# Patient Record
Sex: FEMALE | Race: WHITE | NOT HISPANIC OR LATINO | Employment: FULL TIME | URBAN - METROPOLITAN AREA
[De-identification: names, ages, dates, MRNs, and addresses within clinical notes are randomized per-mention and may not be internally consistent; named-entity substitution may affect disease eponyms.]

---

## 2023-09-06 ENCOUNTER — TELEPHONE (OUTPATIENT)
Dept: OBGYN CLINIC | Facility: CLINIC | Age: 33
End: 2023-09-06

## 2023-09-06 NOTE — TELEPHONE ENCOUNTER
9/6/23-1stPN transfer from [facility] (1 live birth) and  OB/GYN (1 miscarriage), will have records faxed prior to 1st appt

## 2023-09-12 NOTE — TELEPHONE ENCOUNTER
[facility] [place] [facility] Care[facility] which patient will need to sign a release in order for nurses to obtain the records. Also, please have patient sign [facility] medical release form as well so the nurse can obtain [facility] reports.

## 2023-09-29 NOTE — TELEPHONE ENCOUNTER
Did patient sign medical release for [facility] so therefore the nurse can obtain some report for 2022? Missing recent pap results.

## 2023-10-05 PROBLEM — E66.811 CLASS 1 OBESITY DUE TO EXCESS CALORIES WITHOUT SERIOUS COMORBIDITY WITH BODY MASS INDEX (BMI) OF 30.0 TO 30.9 IN ADULT: Status: ACTIVE | Noted: 2023-10-05

## 2023-10-05 LAB
EXTERNAL CHLAMYDIA SCREEN: NEGATIVE
EXTERNAL GONORRHEA SCREEN: NEGATIVE

## 2023-10-09 LAB
EXTERNAL ANTIBODY SCREEN: NORMAL
EXTERNAL HEMATOCRIT: 37.7 %
EXTERNAL HEMOGLOBIN: 12.2 G/DL
EXTERNAL HEPATITIS B SURFACE ANTIGEN: NEGATIVE
EXTERNAL HIV-1 AB: NORMAL
EXTERNAL HIV-1 P24 ANTIGEN: NORMAL
EXTERNAL HIV-1/2 AB-AG: NORMAL
EXTERNAL HIV-2 AB: NORMAL
EXTERNAL PLATELET COUNT: 228 K/ÂΜL
EXTERNAL RH FACTOR: POSITIVE
EXTERNAL RUBELLA IGG QUANTITATION: NORMAL
EXTERNAL SYPHILIS TOTAL IGG/IGM SCREENING: NONREACTIVE

## 2023-10-12 PROBLEM — R82.71 GBS BACTERIURIA: Status: ACTIVE | Noted: 2023-10-12

## 2023-10-17 PROBLEM — O99.341 ANXIETY IN PREGNANCY IN FIRST TRIMESTER, ANTEPARTUM: Status: ACTIVE | Noted: 2023-10-05

## 2023-10-17 PROBLEM — O99.210 OBESITY IN PREGNANCY, ANTEPARTUM: Status: ACTIVE | Noted: 2023-10-17

## 2023-10-18 ENCOUNTER — ROUTINE PRENATAL (OUTPATIENT)
Dept: PERINATAL CARE | Facility: OTHER | Age: 33
End: 2023-10-18
Payer: COMMERCIAL

## 2023-10-18 ENCOUNTER — TELEPHONE (OUTPATIENT)
Dept: PERINATAL CARE | Facility: OTHER | Age: 33
End: 2023-10-18

## 2023-10-18 VITALS
HEIGHT: 65 IN | SYSTOLIC BLOOD PRESSURE: 138 MMHG | DIASTOLIC BLOOD PRESSURE: 86 MMHG | BODY MASS INDEX: 30.75 KG/M2 | HEART RATE: 103 BPM | WEIGHT: 184.6 LBS

## 2023-10-18 DIAGNOSIS — Z36.89 ENCOUNTER FOR OTHER SPECIFIED ANTENATAL SCREENING: ICD-10-CM

## 2023-10-18 DIAGNOSIS — O99.341 ANXIETY IN PREGNANCY IN FIRST TRIMESTER, ANTEPARTUM: ICD-10-CM

## 2023-10-18 DIAGNOSIS — F41.9 ANXIETY IN PREGNANCY IN FIRST TRIMESTER, ANTEPARTUM: ICD-10-CM

## 2023-10-18 DIAGNOSIS — E66.09 CLASS 1 OBESITY DUE TO EXCESS CALORIES WITHOUT SERIOUS COMORBIDITY WITH BODY MASS INDEX (BMI) OF 30.0 TO 30.9 IN ADULT: ICD-10-CM

## 2023-10-18 DIAGNOSIS — O09.521 AMA (ADVANCED MATERNAL AGE) MULTIGRAVIDA 35+, FIRST TRIMESTER: Primary | ICD-10-CM

## 2023-10-18 DIAGNOSIS — Z3A.12 12 WEEKS GESTATION OF PREGNANCY: ICD-10-CM

## 2023-10-18 DIAGNOSIS — O99.210 OBESITY IN PREGNANCY, ANTEPARTUM: ICD-10-CM

## 2023-10-18 DIAGNOSIS — Z36.82 ENCOUNTER FOR (NT) NUCHAL TRANSLUCENCY SCAN: ICD-10-CM

## 2023-10-18 DIAGNOSIS — Z87.59 HISTORY OF GESTATIONAL HYPERTENSION: ICD-10-CM

## 2023-10-18 PROCEDURE — 76801 OB US < 14 WKS SINGLE FETUS: CPT | Performed by: OBSTETRICS & GYNECOLOGY

## 2023-10-18 PROCEDURE — 76813 OB US NUCHAL MEAS 1 GEST: CPT | Performed by: OBSTETRICS & GYNECOLOGY

## 2023-10-18 RX ORDER — ASPIRIN 81 MG/1
162 TABLET, CHEWABLE ORAL DAILY
Qty: 180 TABLET | Refills: 3 | Status: SHIPPED | OUTPATIENT
Start: 2023-10-18

## 2023-10-18 NOTE — TELEPHONE ENCOUNTER
Niki 10/18 @ 230 pm to schedule f/u appt from today's visit. Needs level 2 ultrasound in 6-7 weeks. Instructed patient to call the office back to have that scheduled.

## 2023-10-18 NOTE — PROGRESS NOTES
Patient chose to have [facility] Non-invasive Prenatal Screen with fetal sex (per pt request). Patient choose billed through insurance. Patient assistance program (PAP) application provided to patient no. PAP sent with specimen No.     Patient given brochure and is aware [facility] will contact their insurance and coordinate coverage. Patient made aware she will receive a text message and e-mail from [employer]. Patient informed text/phone call message will come from area code  "415". Provided [employer] # [phone] and web site at [email or web address].   "[place] your test online" card with barcode and test tube ID provided to patient. Reviewed [facility]'s web site states 5-7 business days for results via their portal.   [employer] message will be sent to patient when [facility] receives results /provider reviews. 2 vials of blood drawn from  left  arm by Albaro Boucher MA. Patient tolerated blood draw without difficulty. Specimens labeled with patient identifiers (name, date of birth, specimen collection date), order and specimen were verified with patient, packed and sent via [facility]. FED EX  tracking #  [id number]  Copy of lab order scanned to Epic media. Maternal Fetal Medicine will have results in approximately 7-10 business days and will call patient or notify via [address] [place] [address]. Patient aware viewing lab result online will reveal fetal sex if ordered. Patient verbalized understanding of all instructions and no questions at this time.

## 2023-10-18 NOTE — PATIENT INSTRUCTIONS
You elected to have non-invasive prenatal screening (NIPS). This involves a blood test to check for the four most common genetic syndromes (Trisomy 21, Trisomy 13, Trisomy 25, and sex chromosome abnormalities). It also MAY report the biologic sex of the fetus if you opted to learn this information. You can call our office to verbally review results to avoid inadvertently learning this information via [employer], if desired. Results will be visible in your [employer] portal 7-10 business days from when the test is drawn. Please follow all instructions regarding insurance cost/coverage provided to you today. Please contact our office with any concerns or questions. You will need spina bifida screening (called MSAFP) for the baby beginning at 15 weeks gestation, which will be ordered by your obstetrician's office. This test allows for earlier detection of spina bifida than is possible by ultrasound, and is advised in all pregnancies.

## 2023-10-18 NOTE — PROGRESS NOTES
[address] [facility]: Krys Durbin was seen today for nuchal translucency ultrasound. See ultrasound report under "OB Procedures" tab.    Please don't hesitate to contact our office with any concerns or questions.  -Arline Baker MD

## 2023-11-03 ENCOUNTER — TELEPHONE (OUTPATIENT)
Dept: PERINATAL CARE | Facility: CLINIC | Age: 33
End: 2023-11-03

## 2023-11-03 NOTE — TELEPHONE ENCOUNTER
Left VMM for patient with results of her [facility] NIPS test, gender not provided. Patient instructed on MSAFP being ordered by OB and timing of test. Patient receptive to information and declines further questions at this time.

## 2023-11-03 NOTE — TELEPHONE ENCOUNTER
----- Message from Reed Polanco MD sent at 11/3/2023 12:53 AM EDT -----  Ms. Geovanna Goodman   Your Cell free DNA screening returned as normal.  If you are interested in knowing what the baby's sex is, than you will need to open the lab result to see it. Your obstetrician at your next OB visit should offer screening for spina bifida that can be completed between 12 and 18 weeks and utilizes the blood test called MSAFP. This lab is to see if your baby is at increased risk to have spinal defect.     Reed Polanco MD

## 2023-11-07 ENCOUNTER — ROUTINE PRENATAL (OUTPATIENT)
Dept: OBGYN CLINIC | Facility: CLINIC | Age: 33
End: 2023-11-07
Payer: COMMERCIAL

## 2023-11-07 VITALS
WEIGHT: 183.2 LBS | SYSTOLIC BLOOD PRESSURE: 130 MMHG | BODY MASS INDEX: 30.52 KG/M2 | DIASTOLIC BLOOD PRESSURE: 80 MMHG | HEIGHT: 65 IN

## 2023-11-07 DIAGNOSIS — F41.9 ANXIETY IN PREGNANCY IN FIRST TRIMESTER, ANTEPARTUM: ICD-10-CM

## 2023-11-07 DIAGNOSIS — Z87.59 HISTORY OF GESTATIONAL HYPERTENSION: ICD-10-CM

## 2023-11-07 DIAGNOSIS — R82.71 GBS BACTERIURIA: ICD-10-CM

## 2023-11-07 DIAGNOSIS — Z3A.15 15 WEEKS GESTATION OF PREGNANCY: ICD-10-CM

## 2023-11-07 DIAGNOSIS — O09.521 AMA (ADVANCED MATERNAL AGE) MULTIGRAVIDA 35+, FIRST TRIMESTER: Primary | ICD-10-CM

## 2023-11-07 DIAGNOSIS — E66.09 CLASS 1 OBESITY DUE TO EXCESS CALORIES WITHOUT SERIOUS COMORBIDITY WITH BODY MASS INDEX (BMI) OF 30.0 TO 30.9 IN ADULT: ICD-10-CM

## 2023-11-07 DIAGNOSIS — B00.9 HSV-2 INFECTION: ICD-10-CM

## 2023-11-07 DIAGNOSIS — Z36.1 NEED FOR MATERNAL SERUM ALPHA-PROTEIN (MSAFP) SCREENING: ICD-10-CM

## 2023-11-07 DIAGNOSIS — O99.341 ANXIETY IN PREGNANCY IN FIRST TRIMESTER, ANTEPARTUM: ICD-10-CM

## 2023-11-07 LAB
SL AMB  POCT GLUCOSE, UA: NORMAL
SL AMB POCT URINE PROTEIN: NORMAL

## 2023-11-07 PROCEDURE — PNV: Performed by: PHYSICIAN ASSISTANT

## 2023-11-07 PROCEDURE — 81002 URINALYSIS NONAUTO W/O SCOPE: CPT | Performed by: PHYSICIAN ASSISTANT

## 2023-11-07 NOTE — ASSESSMENT & PLAN NOTE
White coat HTN. Initial BP: 146/72. Repeat was 130/80. Patient took BP at home today. This am 119/71  Afternoon 109/69  Immediately 113/73    Continue  mg until 36 weeks.

## 2023-11-07 NOTE — ASSESSMENT & PLAN NOTE
Reviewed initial prenatal labs, results console updated. Reviewed AFP to evaluate for ONTD. Script given. Has Level II ultrasound scheduled. Return to office for ob check in 4 weeks.

## 2023-11-07 NOTE — PROGRESS NOTES
Routine Prenatal Visit  [address]  [address] [place] [address], Anahy Nealufmann, [address]    Assessment/Plan:  Gab Beaver is a 26y.o. year old  at 15w4d who presents for routine prenatal visit. 1. AMA (advanced maternal age) multigravida 33+, first trimester    2. 15 weeks gestation of pregnancy  Assessment & Plan:  Reviewed initial prenatal labs, results console updated. Reviewed AFP to evaluate for ONTD. Script given. Has Level II ultrasound scheduled. Return to office for ob check in 4 weeks. Orders:  -     POCT urine dip    3. Class 1 obesity due to excess calories without serious comorbidity with body mass index (BMI) of 30.0 to 30.9 in adult    4. History of gestational hypertension  Assessment & Plan:  White coat HTN. Initial BP: 146/72. Repeat was 130/80. Patient took BP at home today. This am 119/71  Afternoon 109/69  Immediately 113/73    Continue  mg until 36 weeks. 5. Anxiety in pregnancy in first trimester, antepartum  Assessment & Plan:  Managed with Zoloft 100mg daily      6. HSV-2 infection  Assessment & Plan: Will plan valtrex at 36 weeks. 7. GBS bacteriuria  Assessment & Plan: Will plan to treat at delivery      8. Need for maternal serum alpha-protein (MSAFP) screening  -     Alpha fetoprotein, maternal; Future  -     Alpha fetoprotein, maternal        Next OB Visit  weeks. Subjective:     CC: Prenatal care    Manisha Herr is a 26 y.o.  female who presents for routine prenatal care at 15w4d. Pregnancy ROS: Reports nausea, no vomiting. No FM, HA, cramping, VB, LOF, edema, domestic violence, or smoking. Tolerating PNV.     This am 119/71  Afternoon 109/69  Immediately 113/73    The following portions of the patient's history were reviewed and updated as appropriate: allergies, current medications, past family history, past medical history, obstetric history, gynecologic history, past social history, past surgical history and problem list.      Objective:  /80 (BP Location: Left arm, Patient Position: Sitting, Cuff Size: Standard) Comment: white coat syndrome  Ht 5' 5" (1.651 m)   Wt 83.1 kg (183 lb 3.2 oz)   LMP 2023 (Exact Date)   BMI 30.49 kg/m²   Pregravid Weight/BMI: 85.3 kg (188 lb) (BMI 31.28)  Current Weight: 83.1 kg (183 lb 3.2 oz)   Total Weight Gain: -2.177 kg (-4 lb 12.8 oz)   Pre- Vitals      Flowsheet Row Most Recent Value   Prenatal Assessment    Fetal Heart Rate 160   Fundal Height (cm) 15 cm   Movement Absent   Prenatal Vitals    Blood Pressure 130/80  [white coat syndrome]   Weight - Scale 83.1 kg (183 lb 3.2 oz)   Urine Albumin/Glucose    Dilation/Effacement/Station    Vaginal Drainage    Edema    LLE Edema None   RLE Edema None   Facial Edema None             General: Well appearing, no distress  Abdomen: Soft, gravid, nontender  Fundal Height: Appropriate for gestational age. Extremities: Warm and well perfused. Non tender.

## 2023-12-04 ENCOUNTER — ROUTINE PRENATAL (OUTPATIENT)
Dept: OBGYN CLINIC | Facility: CLINIC | Age: 33
End: 2023-12-04
Payer: COMMERCIAL

## 2023-12-04 VITALS — WEIGHT: 183.4 LBS | DIASTOLIC BLOOD PRESSURE: 70 MMHG | BODY MASS INDEX: 30.52 KG/M2 | SYSTOLIC BLOOD PRESSURE: 130 MMHG

## 2023-12-04 DIAGNOSIS — O98.312 GENITAL HERPES AFFECTING PREGNANCY IN SECOND TRIMESTER: ICD-10-CM

## 2023-12-04 DIAGNOSIS — O99.212 OBESITY AFFECTING PREGNANCY IN SECOND TRIMESTER, UNSPECIFIED OBESITY TYPE: Primary | ICD-10-CM

## 2023-12-04 DIAGNOSIS — F41.9 ANXIETY IN PREGNANCY IN FIRST TRIMESTER, ANTEPARTUM: ICD-10-CM

## 2023-12-04 DIAGNOSIS — R82.71 GBS BACTERIURIA: ICD-10-CM

## 2023-12-04 DIAGNOSIS — O99.341 ANXIETY IN PREGNANCY IN FIRST TRIMESTER, ANTEPARTUM: ICD-10-CM

## 2023-12-04 DIAGNOSIS — A60.09 GENITAL HERPES AFFECTING PREGNANCY IN SECOND TRIMESTER: ICD-10-CM

## 2023-12-04 DIAGNOSIS — Z3A.19 19 WEEKS GESTATION OF PREGNANCY: ICD-10-CM

## 2023-12-04 DIAGNOSIS — Z87.59 HISTORY OF GESTATIONAL HYPERTENSION: ICD-10-CM

## 2023-12-04 DIAGNOSIS — O09.521 AMA (ADVANCED MATERNAL AGE) MULTIGRAVIDA 35+, FIRST TRIMESTER: ICD-10-CM

## 2023-12-04 PROBLEM — O99.210 OBESITY AFFECTING PREGNANCY: Status: ACTIVE | Noted: 2023-10-05

## 2023-12-04 PROBLEM — O98.319 GENITAL HERPES AFFECTING PREGNANCY: Status: ACTIVE | Noted: 2023-10-05

## 2023-12-04 LAB
SL AMB  POCT GLUCOSE, UA: NEGATIVE
SL AMB POCT URINE PROTEIN: NEGATIVE

## 2023-12-04 PROCEDURE — PNV: Performed by: STUDENT IN AN ORGANIZED HEALTH CARE EDUCATION/TRAINING PROGRAM

## 2023-12-04 PROCEDURE — 81002 URINALYSIS NONAUTO W/O SCOPE: CPT | Performed by: STUDENT IN AN ORGANIZED HEALTH CARE EDUCATION/TRAINING PROGRAM

## 2023-12-04 NOTE — PROGRESS NOTES
Routine Prenatal Visit  [address]  [facility], [address]    Assessment/Plan:  Li Munoz is a 26y.o. year old  at 19w3d who presents for routine prenatal visit. 1. Obesity affecting pregnancy in second trimester, unspecified obesity type    2. AMA (advanced maternal age) multigravida 33+, first trimester  Assessment & Plan:  - Recommend  mg PO daily for pre-eclampsia risk reduction due to BMI, AMA, and history of gHTN. 3. Anxiety in pregnancy in first trimester, antepartum  Assessment & Plan:  - Mood stable on Zoloft 100 mg PO daily. 4. 19 weeks gestation of pregnancy  Assessment & Plan:  - Prenatal lab results reviewed. - Aneuploidy screening reviewed. Patient plans to have msAFP drawn this week. - First trimester MFM consult report reviewed. Level II ultrasound is scheduled. - Problem list updated, results console reviewed and updated with pertinent prenatal labs. - PMH, PSH, medications reviewed and updated as needed. - Return to office in 4 wk(s) for routine prenatal care. Orders:  -     POCT urine dip    5. Genital herpes affecting pregnancy in second trimester  Assessment & Plan:  - Plan for Valtrex suppression at 36 weeks. 6. History of gestational hypertension  Assessment & Plan:  - Recommend  mg PO daily for pre-eclampsia risk reduction due to BMI, AMA, and history of gHTN. 7. GBS bacteriuria  Assessment & Plan:  - Plan for prophylaxis intrapartum. Subjective:   Yashira Warner is a 26 y.o.  who presents for routine prenatal care at 19w3d. Complaints today: No  LOF: No; VB: No; Contractions: No; FM: Present    Objective:  /70   Wt 83.2 kg (183 lb 6.4 oz)   LMP 2023 (Exact Date)   BMI 30.52 kg/m²     General: Well appearing, no distress  Respiratory: Unlabored breathing  Cardiovascular: Regular rate. Abdomen: Soft, gravid, nontender  Extremities: Warm and well perfused.   Non tender.     Pregravid Weight/BMI: 85.3 kg (188 lb) (BMI 31.28)  Current Weight: 83.2 kg (183 lb 6.4 oz)   Total Weight Gain: -2.087 kg (-4 lb 9.6 oz)     Pre- Vitals      Flowsheet Row Most Recent Value   Prenatal Assessment    Movement Present   Prenatal Vitals    Blood Pressure 130/70   Weight - Scale 83.2 kg (183 lb 6.4 oz)   Urine Albumin/Glucose    Dilation/Effacement/Station    Vaginal Drainage    Edema              Priscilla Valles MD  2023 2:10 PM

## 2023-12-06 ENCOUNTER — ROUTINE PRENATAL (OUTPATIENT)
Dept: PERINATAL CARE | Facility: OTHER | Age: 33
End: 2023-12-06
Payer: COMMERCIAL

## 2023-12-06 VITALS
BODY MASS INDEX: 30.12 KG/M2 | HEIGHT: 65 IN | DIASTOLIC BLOOD PRESSURE: 70 MMHG | SYSTOLIC BLOOD PRESSURE: 122 MMHG | HEART RATE: 107 BPM | WEIGHT: 180.8 LBS

## 2023-12-06 DIAGNOSIS — Z3A.19 19 WEEKS GESTATION OF PREGNANCY: ICD-10-CM

## 2023-12-06 DIAGNOSIS — Z36.86 ENCOUNTER FOR ANTENATAL SCREENING FOR CERVICAL LENGTH: ICD-10-CM

## 2023-12-06 DIAGNOSIS — O09.522 AMA (ADVANCED MATERNAL AGE) MULTIGRAVIDA 35+, SECOND TRIMESTER: Primary | ICD-10-CM

## 2023-12-06 PROCEDURE — 76817 TRANSVAGINAL US OBSTETRIC: CPT | Performed by: OBSTETRICS & GYNECOLOGY

## 2023-12-06 PROCEDURE — 76811 OB US DETAILED SNGL FETUS: CPT | Performed by: OBSTETRICS & GYNECOLOGY

## 2023-12-06 PROCEDURE — 99213 OFFICE O/P EST LOW 20 MIN: CPT | Performed by: OBSTETRICS & GYNECOLOGY

## 2023-12-06 NOTE — PROGRESS NOTES
The patient was seen today for an ultrasound. Please see ultrasound report (located under Ob Procedures) for additional details. Thank you very much for allowing us to participate in the care of this very nice patient. Should you have any questions, please do not hesitate to contact me. [place] [id number] [facility]  Attending Physician, [address] [place] [address]

## 2023-12-06 NOTE — PROGRESS NOTES
Ultrasound Probe Disinfection    A transvaginal ultrasound was performed. Prior to use, disinfection was performed with High Level Disinfection Process ([employer]). Probe serial number U2: [id number] was used.       Sarah Rosenberg  12/06/23  7:57 AM

## 2023-12-11 LAB
# FETUSES US: 1
AFP ADJ MOM SERPL: 0.85
AFP INTERP SERPL-IMP: NORMAL
AFP SERPL-MCNC: 44.2 NG/ML
AGE: NORMAL
DONATED EGG PATIENT QL: NO
GA CLIN EST: 20 WEEKS
GA METHOD: NORMAL
HX OF NTD NARR: NO
HX OF TRISOMY 21 NARR: NO
IDDM PATIENT QL: NO
NEURAL TUBE DEFECT RISK FETUS: NORMAL %
SL AMB REPEAT SPECIMEN: NO

## 2023-12-28 ENCOUNTER — TELEPHONE (OUTPATIENT)
Dept: OBGYN CLINIC | Facility: CLINIC | Age: 33
End: 2023-12-28

## 2023-12-28 NOTE — TELEPHONE ENCOUNTER
Patient called into office reporting testing positive for covid.  Patient states she has congestive symptoms and feeling achy.  She denies any LOF, vaginal bleeding, or pain.  She confirms fetal movement.  Pt advised to push fluids and increase water intake, and informed she can take sudafed but w/o the DM. Pt advised to call office back if she is still symptomatic before next routine prenatal appointment.  Pt voiced understanding and appreciation for phone call.

## 2023-12-30 ENCOUNTER — OFFICE VISIT (OUTPATIENT)
Dept: URGENT CARE | Facility: CLINIC | Age: 33
End: 2023-12-30
Payer: COMMERCIAL

## 2023-12-30 VITALS
RESPIRATION RATE: 18 BRPM | WEIGHT: 181 LBS | TEMPERATURE: 97.6 F | BODY MASS INDEX: 30.16 KG/M2 | SYSTOLIC BLOOD PRESSURE: 122 MMHG | DIASTOLIC BLOOD PRESSURE: 70 MMHG | OXYGEN SATURATION: 99 % | HEIGHT: 65 IN | HEART RATE: 90 BPM

## 2023-12-30 DIAGNOSIS — J02.9 SORE THROAT: ICD-10-CM

## 2023-12-30 DIAGNOSIS — U07.1 COVID-19: Primary | ICD-10-CM

## 2023-12-30 LAB — S PYO AG THROAT QL: NEGATIVE

## 2023-12-30 PROCEDURE — 99213 OFFICE O/P EST LOW 20 MIN: CPT

## 2023-12-30 PROCEDURE — 87880 STREP A ASSAY W/OPTIC: CPT

## 2023-12-30 NOTE — PROGRESS NOTES
[facility]        NAME: Michelle Doe is a 33 y.o. female  : 1990    MRN: [id number]  DATE: 2023  TIME: 1:50 PM    Assessment and Plan   COVID-19 [U07.1]  1. COVID-19        2. Sore throat  POCT rapid strepA    Throat culture            Patient Instructions     Your rapid strep test was negative. No antibiotics are needed at this time.     Throat swab will be sent for definitive culture. You can download [facility] MyChart for the results which take approximately 48-72 hours. You will be notified if positive.     For sore throat you can use Cepacol lozenges, do warm salt water gargles, drink warm water with lemon or herbal teas, or use an over-the-counter throat spray (Chloraseptic).    Follow up with your PCP in 3-5 days if symptoms persist.    Go to the ER if symptoms significantly worsen.       Chief Complaint     Chief Complaint   Patient presents with    Cough     Pt c/o sore throat and productive cough. She states symptoms began Tuesday. She tested positive for COVID-19 using a home test on Tuesday. She is 23 weeks pregnant.          History of Present Illness       This is a 33-year-old female known Covid positive  currently 23-weeks-gestation presenting with sore throat and a cough x5 days. No CP, chest tightness, SOB, or wheezing. Also with chills, body aches, and a few episodes of vomiting. No known fevers. Took Benadryl last night in order to sleep.         Review of Systems   Review of Systems   Constitutional:  Positive for chills. Negative for fever.   HENT:  Positive for sore throat. Negative for congestion, ear pain and rhinorrhea.    Eyes:  Negative for discharge and redness.   Respiratory:  Positive for cough. Negative for chest tightness, shortness of breath and wheezing.    Cardiovascular:  Negative for chest pain and palpitations.   Gastrointestinal:  Positive for vomiting. Negative for abdominal pain and diarrhea.   Musculoskeletal:  Positive for  "myalgias. Negative for joint swelling.   Skin:  Negative for pallor and rash.   Neurological:  Negative for dizziness, light-headedness and headaches.         Current Medications       Current Outpatient Medications:     aspirin 81 mg chewable tablet, Chew 2 tablets (162 mg total) daily, Disp: 180 tablet, Rfl: 3    Prenatal-FE Bis-FA-DHA w/o A (COMPLETE PRENATAL/DHA PO), Take by mouth Natiure's Bounty with DHA (43 mg) 8 mg EPA, Disp: , Rfl:     sertraline (ZOLOFT) 100 mg tablet, Take 100 mg by mouth daily, Disp: , Rfl:     Current Allergies     Allergies as of 12/30/2023    (No Known Allergies)            The following portions of the patient's history were reviewed and updated as appropriate: allergies, current medications, past family history, past medical history, past social history, past surgical history and problem list.     Past Medical History:   Diagnosis Date    Anxiety     History of abnormal cervical Pap smear     +HPV    History of ovarian cyst     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 2010       Past Surgical History:   Procedure Laterality Date    TONSILECTOMY AND ADNOIDECTOMY      WISDOM TOOTH EXTRACTION         Family History   Problem Relation Age of Onset    Esophageal cancer Father          Medications have been verified.        Objective   /70 (BP Location: Right arm, Patient Position: Sitting, Cuff Size: Standard)   Pulse 90   Temp 97.6 °F (36.4 °C)   Resp 18   Ht 5' 5\" (1.651 m)   Wt 82.1 kg (181 lb)   LMP 07/21/2023 (Exact Date)   SpO2 99%   BMI 30.12 kg/m²        Physical Exam     Physical Exam  Vitals and nursing note reviewed.   Constitutional:       General: She is not in acute distress.     Appearance: She is not ill-appearing or diaphoretic.   HENT:      Head: Normocephalic.      Right Ear: Tympanic membrane, ear canal and external ear normal.      Left Ear: Tympanic membrane, ear canal and external ear normal.      Nose: Nose normal.      " Mouth/Throat:      Mouth: Mucous membranes are moist.      Pharynx: Oropharynx is clear. Posterior oropharyngeal erythema present. No oropharyngeal exudate.   Eyes:      Conjunctiva/sclera: Conjunctivae normal.      Pupils: Pupils are equal, round, and reactive to light.   Cardiovascular:      Rate and Rhythm: Normal rate and regular rhythm.      Heart sounds: Normal heart sounds.   Pulmonary:      Effort: Pulmonary effort is normal.      Breath sounds: Normal breath sounds.   Musculoskeletal:         General: Normal range of motion.      Cervical back: Normal range of motion and neck supple.   Lymphadenopathy:      Cervical: No cervical adenopathy.   Skin:     General: Skin is warm and dry.      Capillary Refill: Capillary refill takes less than 2 seconds.   Neurological:      Mental Status: She is alert and oriented to person, place, and time.

## 2023-12-30 NOTE — PATIENT INSTRUCTIONS
Your rapid strep test was negative. No antibiotics are needed at this time.     Throat swab will be sent for definitive culture. You can download [employer] for the results which take approximately 48-72 hours. You will be notified if positive.     For sore throat you can use Cepacol lozenges, do warm salt water gargles, drink warm water with lemon or herbal teas, or use an over-the-counter throat spray (Chloraseptic).    Follow up with your PCP in 3-5 days if symptoms persist.    Go to the ER if symptoms significantly worsen.

## 2023-12-31 ENCOUNTER — AMB VIDEO VISIT (OUTPATIENT)
Dept: OTHER | Facility: HOSPITAL | Age: 33
End: 2023-12-31

## 2023-12-31 PROCEDURE — ECARE PR SL URGENT CARE VIRTUAL VISIT: Performed by: INTERNAL MEDICINE

## 2023-12-31 NOTE — CARE ANYWHERE EVISITS
Visit Summary for Michelle Doe - Gender: Female - Date of Birth: 1990  Date: [id number] - Duration: 4 minutes  Patient: Michelle Doe  Provider: Chayo Juarez    Patient Contact Information  Address  [address] [place]; [place] [address]  [phone]    Visit Topics  Sinus infection  [Added By: Self - 2023-12-31]    Triage Questions   What is your current physical address in the event of a medical emergency? Answer []  Are you allergic to any medications? Answer []  Are you now or could you be pregnant? Answer []  Do you have any immune system compromise or chronic lung   disease? Answer []  Do you have any vulnerable family members in the home (infant, pregnant, cancer, elderly)? Answer []     Conversation Transcripts  [0A][0A] [Notification] Ricardo Quezada, Global Staff, will help you prepare for your visit. She is assisting Chayo Juarez, Adult Medicine.[0A][Ricardo Quezada] Caren, and thank you for connecting. While you are waiting for the doctor, are there any   questions I can answer for you about our service? Please contact customer service if you have questions about billing, insurance, or technical issues. Visits work best with a stable WiFi connection, so please make sure you are connected before we   begin.[0A][Notification] Ricardo Quezada has left the room.[0A][Notification] You are connected with Chayo Juarez, Adult Medicine.[0A][Notification] Michelle Doe is located in [place].[0A][Notification] Michelle Doe has shared health   history...[0A]    Diagnosis  Acute sinusitis, unspecified  COVID-19    Procedures  Value: [id number] Code: CPT-4 UNLISTED E&M SERVICE    Medications Prescribed    amoxicillin  Dose : 1 tablet  Route : oral  Frequency : 2 times a day. Until directed to stop.     Refills : 0  Instructions to the Pharmacist : check with OBGYN prior to taking. Substitutions allowed      Provider Notes  [0A][0A] Mode of Communication: audio[0A]History of Present  Illness[0A]        Michelle  presented with  covid and is pregnant .. She has sinus pressure,  congestion, pain, a sore throat and a cough.  She has been sick for  about  5   days.  and she is   feeling worse [0A]Past Medical History: well[0A]Medications: reviewed[0A]Allergies: nkda[0A]PHYSICAL EXAM: The following is taken from my personal audible examination of Pt CONSTITUTIONAL: Pt is not in any acute distress - is speaking in full   sentences.[0A]ORAL/OROPHARYNGEAL: . No audible hoarseness.[0A]RESPIRATORY: Negative for audible wheezing, cough and conversational dyspnea or stridor. [0A]PSYCH: Appropriate affect. Speech is coherent in casual conversation.[0A]The remainder of the   physical exam is noncontributory[0A]Assessment  covid /sinusitis ,  probably still viral[0A]Plan  rest, increase fluids[0A]        .    Home care:[0A]meds .. if no better in 2-3 days and your OBGYN says it okay then take amoxicillin 500 mg 1 pill twice a   day for 7 days[0A]        a.    Rest, fluids, saline drops and menthol[0A]        b.    Pain relievers: Unless otherwise noted and as long as there's no reason you should not take these consider Acetaminophen, [0A]        c.    For the congestion: ,   nasal saline rinses, humidifier, [0A].    please call back or your pcp if needed[0A]Additional recommendations[0A]1.    If you received a prescription at this visit and you have a question or problem, please call [phone] for prescription   assistance.[0A]2.    Please print a copy of this note and send it to your regular doctor or take it to your next visit so it may be included in your medical record.[0A]3.    Please see your primary care provider on an annual basis or more frequently if   directed.[0A][0A]    Electronically signed by: Chayo Juarez(NPI [id number])

## 2024-01-02 ENCOUNTER — ROUTINE PRENATAL (OUTPATIENT)
Dept: OBGYN CLINIC | Facility: CLINIC | Age: 34
End: 2024-01-02
Payer: COMMERCIAL

## 2024-01-02 VITALS — BODY MASS INDEX: 30.12 KG/M2 | WEIGHT: 181 LBS | DIASTOLIC BLOOD PRESSURE: 70 MMHG | SYSTOLIC BLOOD PRESSURE: 110 MMHG

## 2024-01-02 DIAGNOSIS — Z3A.23 23 WEEKS GESTATION OF PREGNANCY: Primary | ICD-10-CM

## 2024-01-02 DIAGNOSIS — Z23 NEED FOR INFLUENZA VACCINATION: ICD-10-CM

## 2024-01-02 DIAGNOSIS — R82.71 GBS BACTERIURIA: ICD-10-CM

## 2024-01-02 DIAGNOSIS — A60.09 GENITAL HERPES AFFECTING PREGNANCY IN SECOND TRIMESTER: ICD-10-CM

## 2024-01-02 DIAGNOSIS — O98.312 GENITAL HERPES AFFECTING PREGNANCY IN SECOND TRIMESTER: ICD-10-CM

## 2024-01-02 LAB
SL AMB  POCT GLUCOSE, UA: NEGATIVE
SL AMB POCT URINE PROTEIN: NEGATIVE

## 2024-01-02 PROCEDURE — 90686 IIV4 VACC NO PRSV 0.5 ML IM: CPT | Performed by: OBSTETRICS & GYNECOLOGY

## 2024-01-02 PROCEDURE — 90471 IMMUNIZATION ADMIN: CPT | Performed by: OBSTETRICS & GYNECOLOGY

## 2024-01-02 PROCEDURE — PNV: Performed by: OBSTETRICS & GYNECOLOGY

## 2024-01-02 PROCEDURE — 81002 URINALYSIS NONAUTO W/O SCOPE: CPT | Performed by: OBSTETRICS & GYNECOLOGY

## 2024-01-02 RX ORDER — AMOXICILLIN 500 MG/1
TABLET, FILM COATED ORAL
COMMUNITY
Start: 2023-12-31

## 2024-01-02 NOTE — PROGRESS NOTES
Routine Prenatal Visit  [facility] - [place] [address], [address], [place] [address]    Assessment/Plan:  Michelle is a 33 y.o. year old  at 23w4d who presents for routine prenatal visit.     1. 23 weeks gestation of pregnancy  -     POCT urine dip    2. Genital herpes affecting pregnancy in second trimester    3. GBS bacteriuria    4. Need for influenza vaccination  -     influenza vaccine, quadrivalent, 0.5 mL, preservative-free, for adult and pediatric patients 6 mos+ (AFLURIA, FLUARIX, FLULAVAL, FLUZONE)      + fm  no UTCX   + COVID  in  second trimester   2023   has  fu  US.   No leaking of fluid.   Flu shot today.      Subjective:     CC: Prenatal care    Michelle Doe is a 33 y.o.  female who presents for routine prenatal care at 23w4d.  Pregnancy ROS: no  leakage of fluid, pelvic pain, or vaginal bleeding.  +  fetal movement.    The following portions of the patient's history were reviewed and updated as appropriate: allergies, current medications, past family history, past medical history, obstetric history, gynecologic history, past social history, past surgical history and problem list.      Objective:  /70   Wt 82.1 kg (181 lb)   LMP 2023 (Exact Date)   BMI 30.12 kg/m²   Pregravid Weight/BMI: 85.3 kg (188 lb) (BMI 31.28)  Current Weight: 82.1 kg (181 lb)   Total Weight Gain: -3.175 kg (-7 lb)   Pre- Vitals    Flowsheet Row Most Recent Value   Prenatal Assessment    Fetal Heart Rate 136   Fundal Height (cm) 24 cm   Prenatal Vitals    Blood Pressure 110/70   Weight - Scale 82.1 kg (181 lb)   Urine Albumin/Glucose    Dilation/Effacement/Station    Vaginal Drainage    Draining Fluid No   Edema    LLE Edema None   RLE Edema None   Facial Edema None           General: Well appearing, no distress  Respiratory: Unlabored breathing  Cardiovascular: Regular rate.  Abdomen: Soft, gravid, nontender  Fundal Height: Appropriate for gestational age.  Extremities:  Warm and well perfused.  Non tender.

## 2024-01-02 NOTE — PATIENT INSTRUCTIONS
NUTRITION IN PREGNANCY  Good Nutrition is a VERY important part of having a healthy pregnancy and healthy baby.  You should follow a healthy diet which include the following:   * Vegetables (which are dark green and leafy): at least 2 servings each day   * Protein (meat, eggs, beans, nuts, peanut butter): 3-4 servings each day   * Breads/whole grains (bread, pasta, rice, tortillas, potatoes): 3 servings each day   * Dairy (milk, yogurt, cheese): 3-4 servings each day   * Water: 6-8 glasses per day   * Calories: approximately 2000 to 2200 calories per day     WEIGHT GAIN   Recommended weight gain for you during your pregnancy is based on your body mass index (BMI) at the time that you became pregnant.   Pre-pregnant BMI Recommended weight gain   Underweight (BMI less than 18.5) 28 to 40 pounds   Normal weight (BMI 18.5-24.9) 25 to 35 pounds   Overweight (BMI 25-29.9) 15 to 25 pounds   Obese (BMI 30 or greater) 11 to 20 pounds     FOOD SAFETY   It is VERY important to eat only safely-prepared foods during pregnancy as you and your baby have a higher risk than usual for being affected by foodborne illnesses.  Follow these steps to ensure that you and your baby are safe from foodborne illnesses while you are pregnant:   wash hands thoroughly with warm water and soap before and after handling any foods   wash cutting boards, dishes, utensils, and countertops with hot water and soap before and after preparing any foods   rinse raw fruits and vegetables thoroughly under running water before eating   keep raw meat and seafood separate from other foods and use different cutting boards/utensils to handle raw meat than for other foods   put cooked food on a freshly clean plate   cook all of your foods thoroughly   discard foods that have been left out for more than 2 hours   refrigerate or freeze any foods than can spoil     There are three particular foodborne risks that you should be aware of and avoid as they can cause  serious harm to your unborn child.     * Listeria (a harmful bacteria)   don’t eat hot dogs or deli meats (unless they’re reheated until steaming hot)   don’t eat soft cheeses (such as Feta, Brie, Camembert) unless they are specifically labeled as being “made with pasteurized milk”   don’t drink raw (unpasteurized) milk   don’t eat refrigerated pates or meat spreads   don’t eat refrigerated smoked seafood unless it’s in a cooked dish like a casserole     * Mercury (a metal which is found in certain fish in high levels)   don’t eat shark, tilefish, frances mackerel, or swordfish   don’t eat more than 12 ounces per week of shrimp, salmon, pollock, or catfish   when eating tuna fish, you can have up to 6 ounces per week of canned albacore tuna OR up to 12 ounces of canned light tuna     * Toxoplasma (a harmful parasite)   cook meat thoroughly before eating   wear gloves when gardening or handling sand from a child’s sandbox   if you ha tve a cat, have someone else change the litter box while you are pregnant.    if you HAVE to clean it yourself, be sure to wash your hands thoroughly afterwards with warm water and soap.   don’t get a NEW cat while you are pregnant

## 2024-01-24 ENCOUNTER — TELEPHONE (OUTPATIENT)
Dept: OBGYN CLINIC | Facility: CLINIC | Age: 34
End: 2024-01-24

## 2024-01-24 NOTE — TELEPHONE ENCOUNTER
"Pt is currently 26w5d GA. Pt reports she slipped down a snowy hill last evening, fell on her bottom.  States, \"it was not a hard fall and did not strike her abdomen.\" Pt denies any pelvic pain, spotting, contractions, or leakage of fluids. Pt subjectively reports good fetal activity, has a \"home doppler\" and hearing a good fetal heart tone.    Conferred with Dr. Perera, advised to monitor, if she should have the onset of spotting, leakage of fluids, pelvic pain, call the office or on call provider. Reinforced to call next time after incident occurred to discuss with OB. Pt voiced an understanding.   "

## 2024-02-01 ENCOUNTER — ROUTINE PRENATAL (OUTPATIENT)
Dept: OBGYN CLINIC | Facility: CLINIC | Age: 34
End: 2024-02-01
Payer: COMMERCIAL

## 2024-02-01 VITALS
WEIGHT: 183 LBS | BODY MASS INDEX: 30.49 KG/M2 | HEIGHT: 65 IN | DIASTOLIC BLOOD PRESSURE: 74 MMHG | SYSTOLIC BLOOD PRESSURE: 118 MMHG

## 2024-02-01 DIAGNOSIS — Z3A.27 27 WEEKS GESTATION OF PREGNANCY: Primary | ICD-10-CM

## 2024-02-01 DIAGNOSIS — Z87.59 HISTORY OF GESTATIONAL HYPERTENSION: ICD-10-CM

## 2024-02-01 DIAGNOSIS — O98.313 GENITAL HERPES AFFECTING PREGNANCY IN THIRD TRIMESTER: ICD-10-CM

## 2024-02-01 DIAGNOSIS — O09.523 ADVANCED MATERNAL AGE IN MULTIGRAVIDA, THIRD TRIMESTER: ICD-10-CM

## 2024-02-01 DIAGNOSIS — R82.71 GBS BACTERIURIA: ICD-10-CM

## 2024-02-01 DIAGNOSIS — A60.09 GENITAL HERPES AFFECTING PREGNANCY IN THIRD TRIMESTER: ICD-10-CM

## 2024-02-01 LAB
SL AMB  POCT GLUCOSE, UA: NORMAL
SL AMB POCT URINE PROTEIN: NORMAL

## 2024-02-01 PROCEDURE — PNV: Performed by: NURSE PRACTITIONER

## 2024-02-01 PROCEDURE — 81002 URINALYSIS NONAUTO W/O SCOPE: CPT | Performed by: NURSE PRACTITIONER

## 2024-02-01 NOTE — PROGRESS NOTES
"Routine Prenatal Visit  [facility] - [place]  [address], [place] [address]    Assessment/Plan:  Michelle is a 33 y.o. year old  at 27w6d who presents for routine prenatal visit.     1. 27 weeks gestation of pregnancy  -     Glucose, 1H PG; Future  -     CBC; Future  -     RPR (MONITOR) W/REFL TITER (REFL); Future  -     POCT urine dip  -     Glucose, 1H PG  -     CBC  -     RPR (MONITOR) W/REFL TITER (REFL)    2. History of gestational hypertension    3. GBS bacteriuria    4. Genital herpes affecting pregnancy in third trimester    5. Advanced maternal age in multigravida, third trimester    Rx third trimester labs, RH positive. Follow up growth scheduled for . Plan Valtrex suppression at 36 weeks. Reviewed [facility], S/S PTL, call office with any concerns.         Next OB Visit 2 weeks.    Subjective:     CC: Prenatal care    Michelle Marion is a 33 y.o.  female who presents for routine prenatal care at 27w6d.  Pregnancy ROS: no leakage of fluid, pelvic pain, or vaginal bleeding.  normal fetal movement.    The following portions of the patient's history were reviewed and updated as appropriate: allergies, current medications, past family history, past medical history, obstetric history, gynecologic history, past social history, past surgical history and problem list.      Objective:  /74 (BP Location: Left arm, Patient Position: Sitting, Cuff Size: Standard)   Ht 5' 5\" (1.651 m)   Wt 83 kg (183 lb)   LMP 2023 (Exact Date)   BMI 30.45 kg/m²   Pregravid Weight/BMI: 85.3 kg (188 lb) (BMI 31.28)  Current Weight: 83 kg (183 lb)   Total Weight Gain: -2.268 kg (-5 lb)   Pre-Dave Vitals      Flowsheet Row Most Recent Value   Prenatal Assessment    Fetal Heart Rate 148   Fundal Height (cm) 27 cm   Movement Present   Prenatal Vitals    Blood Pressure 118/74   Weight - Scale 83 kg (183 lb)   Urine Albumin/Glucose    Dilation/Effacement/Station    Vaginal Drainage    Draining Fluid " No   Edema    LLE Edema None   RLE Edema None   Facial Edema None             General: Well appearing, no distress  Abdomen: Soft, gravid, nontender  Extremities: Non tender.

## 2024-02-01 NOTE — PATIENT INSTRUCTIONS
Rx third trimester labs, RH positive. Follow up growth scheduled for 2/28. Plan Valtrex suppression at 36 weeks. Reviewed [facility], S/S PTL, call office with any concerns.

## 2024-02-07 LAB
EXTERNAL HEMOGLOBIN: 10.5 G/DL
EXTERNAL PLATELET COUNT: 210 K/ÂΜL
EXTERNAL SYPHILIS TOTAL IGG/IGM SCREENING: NORMAL

## 2024-02-08 LAB
ERYTHROCYTE [DISTWIDTH] IN BLOOD BY AUTOMATED COUNT: 12.9 % (ref 11–15)
GLUCOSE 1H P 50 G GLC PO SERPL-MCNC: 89 MG/DL
HCT VFR BLD AUTO: 32.7 % (ref 35–45)
HGB BLD-MCNC: 10.5 G/DL (ref 11.7–15.5)
MCH RBC QN AUTO: 27.9 PG (ref 27–33)
MCHC RBC AUTO-ENTMCNC: 32.1 G/DL (ref 32–36)
MCV RBC AUTO: 87 FL (ref 80–100)
PLATELET # BLD AUTO: 210 THOUSAND/UL (ref 140–400)
PMV BLD REES-ECKER: 11.1 FL (ref 7.5–12.5)
RBC # BLD AUTO: 3.76 MILLION/UL (ref 3.8–5.1)
RPR SER QL: NORMAL
WBC # BLD AUTO: 10.7 THOUSAND/UL (ref 3.8–10.8)

## 2024-02-13 PROBLEM — O99.213 OBESITY AFFECTING PREGNANCY IN THIRD TRIMESTER: Status: ACTIVE | Noted: 2023-10-05

## 2024-02-13 PROBLEM — O99.343 ANXIETY DURING PREGNANCY IN THIRD TRIMESTER, ANTEPARTUM: Status: ACTIVE | Noted: 2023-10-05

## 2024-02-13 PROBLEM — Z3A.29 29 WEEKS GESTATION OF PREGNANCY: Status: ACTIVE | Noted: 2023-11-07

## 2024-02-13 PROBLEM — O99.013 ANEMIA AFFECTING PREGNANCY IN THIRD TRIMESTER: Status: ACTIVE | Noted: 2024-02-13

## 2024-02-14 ENCOUNTER — ROUTINE PRENATAL (OUTPATIENT)
Dept: OBGYN CLINIC | Facility: CLINIC | Age: 34
End: 2024-02-14
Payer: COMMERCIAL

## 2024-02-14 VITALS
HEIGHT: 65 IN | SYSTOLIC BLOOD PRESSURE: 118 MMHG | DIASTOLIC BLOOD PRESSURE: 72 MMHG | BODY MASS INDEX: 31.12 KG/M2 | WEIGHT: 186.8 LBS

## 2024-02-14 DIAGNOSIS — O09.523 AMA (ADVANCED MATERNAL AGE) MULTIGRAVIDA 35+, THIRD TRIMESTER: ICD-10-CM

## 2024-02-14 DIAGNOSIS — F41.9 ANXIETY DURING PREGNANCY IN THIRD TRIMESTER, ANTEPARTUM: ICD-10-CM

## 2024-02-14 DIAGNOSIS — O99.013 ANEMIA AFFECTING PREGNANCY IN THIRD TRIMESTER: Primary | ICD-10-CM

## 2024-02-14 DIAGNOSIS — Z3A.29 29 WEEKS GESTATION OF PREGNANCY: ICD-10-CM

## 2024-02-14 DIAGNOSIS — O99.343 ANXIETY DURING PREGNANCY IN THIRD TRIMESTER, ANTEPARTUM: ICD-10-CM

## 2024-02-14 DIAGNOSIS — Z23 ENCOUNTER FOR IMMUNIZATION: ICD-10-CM

## 2024-02-14 DIAGNOSIS — O98.313 GENITAL HERPES AFFECTING PREGNANCY IN THIRD TRIMESTER: ICD-10-CM

## 2024-02-14 DIAGNOSIS — A60.09 GENITAL HERPES AFFECTING PREGNANCY IN THIRD TRIMESTER: ICD-10-CM

## 2024-02-14 LAB
SL AMB  POCT GLUCOSE, UA: NEGATIVE
SL AMB POCT URINE PROTEIN: NEGATIVE

## 2024-02-14 PROCEDURE — 81002 URINALYSIS NONAUTO W/O SCOPE: CPT | Performed by: OBSTETRICS & GYNECOLOGY

## 2024-02-14 PROCEDURE — 90471 IMMUNIZATION ADMIN: CPT | Performed by: OBSTETRICS & GYNECOLOGY

## 2024-02-14 PROCEDURE — 90715 TDAP VACCINE 7 YRS/> IM: CPT | Performed by: OBSTETRICS & GYNECOLOGY

## 2024-02-14 PROCEDURE — PNV: Performed by: OBSTETRICS & GYNECOLOGY

## 2024-02-14 NOTE — PROGRESS NOTES
"Routine Prenatal Visit  [facility] - [place] [address], [address], [place] [address]    Assessment/Plan:  Michelle is a 33 y.o. year old  at 29w5d who presents for routine prenatal visit.     1. Anemia affecting pregnancy in third trimester  Assessment & Plan:  Reviewed mild anemia - recom start PO iron.  Patient agrees.      2. AMA (advanced maternal age) multigravida 35+, third trimester    3. Anxiety during pregnancy in third trimester, antepartum  Assessment & Plan:  Well controlled on Zoloft.      4. Genital herpes affecting pregnancy in third trimester  Assessment & Plan:  Will plan Valtrex at 36 weeks for prophylaxis.  Patient reports no outbreaks in > 5 years.      5. Encounter for immunization  -     Tdap Vaccine greater than or equal to 8yo    6. 29 weeks gestation of pregnancy  -     POCT urine dip        Next OB Visit 2 weeks.    Subjective:     CC: Prenatal care    Michelle Marion is a 33 y.o.  female who presents for routine prenatal care at 29w5d.  Pregnancy ROS: no leakage of fluid, pelvic pain, or vaginal bleeding.  normal fetal movement.    The following portions of the patient's history were reviewed and updated as appropriate: allergies, current medications, past family history, past medical history, obstetric history, gynecologic history, past social history, past surgical history and problem list.      Objective:  /72   Ht 5' 5\" (1.651 m)   Wt 84.7 kg (186 lb 12.8 oz)   LMP 2023 (Exact Date)   BMI 31.09 kg/m²   Pregravid Weight/BMI: 85.3 kg (188 lb) (BMI 31.28)  Current Weight: 84.7 kg (186 lb 12.8 oz)   Total Weight Gain: -0.544 kg (-1 lb 3.2 oz)   Pre- Vitals      Flowsheet Row Most Recent Value   Prenatal Assessment    Fetal Heart Rate 155   Fundal Height (cm) 30 cm   Movement Present   Prenatal Vitals    Blood Pressure 118/72   Weight - Scale 84.7 kg (186 lb 12.8 oz)   Urine Albumin/Glucose    Dilation/Effacement/Station    Vaginal Drainage "    Edema    LLE Edema None   RLE Edema None   Facial Edema None             General: Well appearing, no distress  Abdomen: Soft, gravid, nontender  Extremities: Non tender.

## 2024-02-21 ENCOUNTER — NURSE TRIAGE (OUTPATIENT)
Dept: OTHER | Facility: OTHER | Age: 34
End: 2024-02-21

## 2024-02-22 ENCOUNTER — ROUTINE PRENATAL (OUTPATIENT)
Dept: OBGYN CLINIC | Facility: CLINIC | Age: 34
End: 2024-02-22

## 2024-02-22 VITALS
BODY MASS INDEX: 30.82 KG/M2 | DIASTOLIC BLOOD PRESSURE: 80 MMHG | HEIGHT: 65 IN | WEIGHT: 185 LBS | SYSTOLIC BLOOD PRESSURE: 140 MMHG

## 2024-02-22 DIAGNOSIS — O98.313 GENITAL HERPES AFFECTING PREGNANCY IN THIRD TRIMESTER: ICD-10-CM

## 2024-02-22 DIAGNOSIS — R82.71 GBS BACTERIURIA: ICD-10-CM

## 2024-02-22 DIAGNOSIS — O99.213 OBESITY AFFECTING PREGNANCY IN THIRD TRIMESTER, UNSPECIFIED OBESITY TYPE: Primary | ICD-10-CM

## 2024-02-22 DIAGNOSIS — O09.523 AMA (ADVANCED MATERNAL AGE) MULTIGRAVIDA 35+, THIRD TRIMESTER: ICD-10-CM

## 2024-02-22 DIAGNOSIS — F41.9 ANXIETY DURING PREGNANCY IN THIRD TRIMESTER, ANTEPARTUM: ICD-10-CM

## 2024-02-22 DIAGNOSIS — A60.09 GENITAL HERPES AFFECTING PREGNANCY IN THIRD TRIMESTER: ICD-10-CM

## 2024-02-22 DIAGNOSIS — O99.343 ANXIETY DURING PREGNANCY IN THIRD TRIMESTER, ANTEPARTUM: ICD-10-CM

## 2024-02-22 DIAGNOSIS — Z87.59 HISTORY OF GESTATIONAL HYPERTENSION: ICD-10-CM

## 2024-02-22 DIAGNOSIS — O99.013 ANEMIA AFFECTING PREGNANCY IN THIRD TRIMESTER: ICD-10-CM

## 2024-02-22 PROBLEM — Z3A.31 31 WEEKS GESTATION OF PREGNANCY: Status: ACTIVE | Noted: 2023-11-07

## 2024-02-22 PROCEDURE — PNV: Performed by: OBSTETRICS & GYNECOLOGY

## 2024-02-22 NOTE — TELEPHONE ENCOUNTER
"Reason for Disposition  • Single episode of faint spotting (e.g., noted when wiping after going to bathroom)    Answer Assessment - Initial Assessment Questions  1. ONSET: \"When did this bleeding start?\"         Just now     2. DESCRIPTION: \"Describe the bleeding that you are having.\" \"How much bleeding is there?\"     - SPOTTING: spotting, or pinkish / brownish mucous discharge; does not fill panti-liner or pad     - MILD:  less than 1 pad / hour; less than patient's usual menstrual bleeding    - MODERATE: 1-2 pads / hour; 1 menstrual cup every 6 hours; small-medium blood clots (e.g., pea, grape, small coin)    - SEVERE: soaking 2 or more pads/hour for 2 or more hours; 1 menstrual cup every 2 hours; bleeding not contained by pads or continuous red blood from vagina; large blood clots (e.g., golf ball, large coin)       Spotting-bright red     3. ABDOMINAL PAIN SEVERITY: If present, ask: \"How bad is it?\"  (e.g., Scale 1-10; mild, moderate, or severe)    - MILD (1-3): doesn't interfere with normal activities, abdomen soft and not tender to touch     - MODERATE (4-7): interferes with normal activities or awakens from sleep, tender to touch     - SEVERE (8-10): excruciating pain, doubled over, unable to do any normal activities      Denies    4. PREGNANCY: \"Do you know how many weeks or months pregnant you are?\"       30 weeks 5 days     5. DARIN: \"What date are you expecting to deliver?\"      4/26/24    6. FETAL MOVEMENT: \"Has the baby's movement decreased or changed significantly from normal?\"      Denies    7. HEMODYNAMIC STATUS: \"Are you weak or feeling lightheaded?\" If Yes, ask: \"Can you stand and walk normally?\"       Denies    8. OTHER SYMPTOMS: \"What other symptoms are you having with the bleeding?\" (e.g., leaking fluid from vagina, contractions)      Denies    Protocols used: Pregnancy - Vaginal Bleeding Greater Than 20 Weeks EGA-ADULT-AH    "

## 2024-02-22 NOTE — PROGRESS NOTES
"Routine Prenatal Visit  [facility] - [place]  [address] [place] [address]    Assessment/Plan:  Michelle is a 33 y.o. year old  at 30w6d who presents for routine prenatal visit.     1. Obesity affecting pregnancy in third trimester, unspecified obesity type    2. History of gestational hypertension    3. AMA (advanced maternal age) multigravida 35+, third trimester  Assessment & Plan:  Has repeat growth next week      4. Anxiety during pregnancy in third trimester, antepartum    5. Genital herpes affecting pregnancy in third trimester    6. GBS bacteriuria    7. Anemia affecting pregnancy in third trimester          Subjective:   Michelle Marion is a 33 y.o.  who presents for routine prenatal care at 30w6d.  Complaints today: Spotting when wiping. No recent trauma, intercourse. No pain. No contraction.  LOF: -; VB: +; Contractions: -; FM: +    Objective:  /80 (BP Location: Left arm, Patient Position: Sitting, Cuff Size: Standard)   Ht 5' 5\" (1.651 m)   Wt 83.9 kg (185 lb)   LMP 2023 (Exact Date)   BMI 30.79 kg/m²     General: Well appearing, no distress  Respiratory: Unlabored breathing  Abdomen: Soft, gravid, nontender  Pelvic exam: Cervix visually closed, no active bleeding, +friable cervix  Extremities: Warm and well perfused.  Non tender.    Pregravid Weight/BMI: 85.3 kg (188 lb) (BMI 31.28)  Current Weight: 83.9 kg (185 lb)   Total Weight Gain: -1.361 kg (-3 lb)     Pre-Dave Vitals      Flowsheet Row Most Recent Value   Prenatal Assessment    Fetal Heart Rate 160   Fundal Height (cm) 31 cm   Movement Present   Prenatal Vitals    Blood Pressure 140/80   Weight - Scale 83.9 kg (185 lb)   Urine Albumin/Glucose    Dilation/Effacement/Station    Vaginal Drainage    Edema              Ivy Arita DO  2024 11:19 AM    "

## 2024-02-22 NOTE — TELEPHONE ENCOUNTER
"Regardin weeks pregnant / light vaginal spotting  ----- Message from Rosi Paredes sent at 2024  8:39 PM EST -----  \"I am 31 weeks pregnant and I have a little bit of spotting. I am not sure what I should do\"    "

## 2024-02-22 NOTE — TELEPHONE ENCOUNTER
Michelle states has been having constipation after starting Iron supplements. Noticed some red color vaginal spotting upon wiping last night. Denies any cramping pains or contractions. (+)FM. The spotting has lessened this morning, is more brownish in color. Reassured about spotting may occur after bearing down with bowel movements. Recommended to take Colace,help with constipation. Michelle verbally agreed & planned on buying Colace today. Informed Dr. Del Castillo of Michelle's symptoms & advised to schedule an office appt.. Informed pt of scheduling an appt.. Provided her with 11 am with Dr. Arita, in [place] office..

## 2024-02-22 NOTE — ASSESSMENT & PLAN NOTE
Reassurance given on spotting. Discussed signs of when to call back. Has appt in 1 week, desires to keep

## 2024-02-27 NOTE — PROGRESS NOTES
Please refer to the [facility] ultrasound report in Ob Procedures for additional information regarding today's visit

## 2024-02-28 ENCOUNTER — ULTRASOUND (OUTPATIENT)
Dept: PERINATAL CARE | Facility: OTHER | Age: 34
End: 2024-02-28
Payer: COMMERCIAL

## 2024-02-28 VITALS
SYSTOLIC BLOOD PRESSURE: 128 MMHG | BODY MASS INDEX: 30.99 KG/M2 | HEART RATE: 105 BPM | WEIGHT: 186 LBS | HEIGHT: 65 IN | DIASTOLIC BLOOD PRESSURE: 70 MMHG

## 2024-02-28 DIAGNOSIS — O99.213 MATERNAL OBESITY, ANTEPARTUM, THIRD TRIMESTER: ICD-10-CM

## 2024-02-28 DIAGNOSIS — O09.523 ELDERLY MULTIGRAVIDA, THIRD TRIMESTER: Primary | ICD-10-CM

## 2024-02-28 DIAGNOSIS — Z3A.31 31 WEEKS GESTATION OF PREGNANCY: ICD-10-CM

## 2024-02-28 DIAGNOSIS — F41.9 ANXIETY DURING PREGNANCY, ANTEPARTUM, THIRD TRIMESTER: ICD-10-CM

## 2024-02-28 DIAGNOSIS — O99.343 ANXIETY DURING PREGNANCY, ANTEPARTUM, THIRD TRIMESTER: ICD-10-CM

## 2024-02-28 PROCEDURE — 99213 OFFICE O/P EST LOW 20 MIN: CPT | Performed by: OBSTETRICS & GYNECOLOGY

## 2024-02-28 PROCEDURE — 76816 OB US FOLLOW-UP PER FETUS: CPT | Performed by: OBSTETRICS & GYNECOLOGY

## 2024-02-28 RX ORDER — ASCORBIC ACID 100 MG
TABLET,CHEWABLE ORAL
COMMUNITY

## 2024-02-28 NOTE — LETTER
February 28, 2024     Ivy Arita V,   [address]  [facility] [address]    Patient: Michelle Marion   YOB: 1990   Date of Visit: 2/28/2024       Dear Dr. Arita:    Thank you for referring Michelle Marion to me for evaluation. Below are my notes for this consultation.    If you have questions, please do not hesitate to call me. I look forward to following your patient along with you.         Sincerely,        Carter Meade MD        CC: No Recipients    Carter Meade MD  2/28/2024  9:26 AM  Sign when Signing Visit  Please refer to the [facility] ultrasound report in Ob Procedures for additional information regarding today's visit

## 2024-03-12 ENCOUNTER — ROUTINE PRENATAL (OUTPATIENT)
Dept: OBGYN CLINIC | Facility: CLINIC | Age: 34
End: 2024-03-12
Payer: COMMERCIAL

## 2024-03-12 VITALS — DIASTOLIC BLOOD PRESSURE: 62 MMHG | WEIGHT: 187.2 LBS | SYSTOLIC BLOOD PRESSURE: 110 MMHG | BODY MASS INDEX: 31.15 KG/M2

## 2024-03-12 DIAGNOSIS — O09.893 PRIOR PREGNANCY COMPLICATED BY PIH, ANTEPARTUM, THIRD TRIMESTER: Primary | ICD-10-CM

## 2024-03-12 DIAGNOSIS — A60.09 GENITAL HERPES AFFECTING PREGNANCY IN THIRD TRIMESTER: ICD-10-CM

## 2024-03-12 DIAGNOSIS — O99.213 OBESITY AFFECTING PREGNANCY IN THIRD TRIMESTER, UNSPECIFIED OBESITY TYPE: ICD-10-CM

## 2024-03-12 DIAGNOSIS — O99.820 GROUP B STREPTOCOCCAL CARRIAGE COMPLICATING PREGNANCY: ICD-10-CM

## 2024-03-12 DIAGNOSIS — O99.343 ANXIETY DURING PREGNANCY IN THIRD TRIMESTER, ANTEPARTUM: ICD-10-CM

## 2024-03-12 DIAGNOSIS — O99.013 ANEMIA AFFECTING PREGNANCY IN THIRD TRIMESTER: ICD-10-CM

## 2024-03-12 DIAGNOSIS — O98.313 GENITAL HERPES AFFECTING PREGNANCY IN THIRD TRIMESTER: ICD-10-CM

## 2024-03-12 DIAGNOSIS — O09.523 AMA (ADVANCED MATERNAL AGE) MULTIGRAVIDA 35+, THIRD TRIMESTER: ICD-10-CM

## 2024-03-12 DIAGNOSIS — F41.9 ANXIETY DURING PREGNANCY IN THIRD TRIMESTER, ANTEPARTUM: ICD-10-CM

## 2024-03-12 DIAGNOSIS — Z3A.33 33 WEEKS GESTATION OF PREGNANCY: ICD-10-CM

## 2024-03-12 PROBLEM — O09.899 PRIOR PREGNANCY COMPLICATED BY PIH, ANTEPARTUM: Status: ACTIVE | Noted: 2023-10-05

## 2024-03-12 LAB
DME PARACHUTE DELIVERY DATE REQUESTED: NORMAL
DME PARACHUTE DELIVERY NOTE: NORMAL
DME PARACHUTE ITEM DESCRIPTION: NORMAL
DME PARACHUTE ORDER STATUS: NORMAL
DME PARACHUTE SUPPLIER NAME: NORMAL
DME PARACHUTE SUPPLIER PHONE: NORMAL
SL AMB  POCT GLUCOSE, UA: NEGATIVE
SL AMB POCT URINE PROTEIN: NEGATIVE

## 2024-03-12 PROCEDURE — 81002 URINALYSIS NONAUTO W/O SCOPE: CPT | Performed by: STUDENT IN AN ORGANIZED HEALTH CARE EDUCATION/TRAINING PROGRAM

## 2024-03-12 PROCEDURE — PNV: Performed by: STUDENT IN AN ORGANIZED HEALTH CARE EDUCATION/TRAINING PROGRAM

## 2024-03-12 RX ORDER — VALACYCLOVIR HYDROCHLORIDE 500 MG/1
500 TABLET, FILM COATED ORAL 2 TIMES DAILY
Qty: 30 TABLET | Refills: 1 | Status: SHIPPED | OUTPATIENT
Start: 2024-03-12 | End: 2024-05-11

## 2024-03-12 NOTE — PATIENT INSTRUCTIONS
You should take your blood pressure at home one time daily.     Normal-range blood pressures in pregnancy are less than 140 for the top number (systolic) AND less than 90 for the bottom number (diastolic)    Mild-range blood pressures in pregnancy are 140-159 systolic OR  diastolic    Severe-range blood pressures in pregnancy are 160 or greater stystolic  or greater diastolic    If your blood pressure is greater than 140/90, you should repeat your blood pressure in 15 minutes and then call [facility] [place] at [phone] and proceed to the hospital for evaluation for pre-eclampsia.    Symptoms of Pre-Eclampsia include headache that does not go away with Tylenol, changes in vision such as blurred vision or spots in your vision, chest pain, shortness of breath, pain in the upper right side of your abdomen, or sudden onset or worsening of swelling.    If you develop any of the above symptoms, you should promptly call [facility] [place] at [phone] and present to the hospital for evaluation of pre-eclampsia.

## 2024-03-12 NOTE — PROGRESS NOTES
Routine Prenatal Visit  [facility] - [place]  [address], [place] [address]    Assessment/Plan:  Michelle is a 33 y.o. year old  at 33w4d who presents for routine prenatal visit.     1. Prior pregnancy complicated by PIH, antepartum, third trimester  Assessment & Plan:  - Continue  mg PO daily until 36 weeks for pre-eclampsia risk reduction.   - Recommend daily BP monitoring at home. Reviewed parameters. Call if SBP > 140 or DBP > 90. Written instructions provided in AVS today.       2. AMA (advanced maternal age) multigravida 35+, third trimester    3. Anxiety during pregnancy in third trimester, antepartum  Assessment & Plan:  - Mood stable on Zoloft 100 mg PO daily. Will continue.       4. Genital herpes affecting pregnancy in third trimester  Assessment & Plan:  - Recommend suppression beginning at 36 weeks. Rx provided today.     Orders:  -     valACYclovir (VALTREX) 500 mg tablet; Take 1 tablet (500 mg total) by mouth 2 (two) times a day    5. Obesity affecting pregnancy in third trimester, unspecified obesity type  Assessment & Plan:  - Weight gain on track.  - Continue  mg PO daily until 36 weeks.       6. Group B streptococcal carriage complicating pregnancy  Assessment & Plan:  - Plan for intrapartum prophylaxis with penicillin.       7. Anemia affecting pregnancy in third trimester  Assessment & Plan:  - Continue oral iron supplementation.      8. 33 weeks gestation of pregnancy  Assessment & Plan:  - PTL/PPROM/Bleeding precautions given. Kick counts reviewed.  - Third trimester labs reviewed.  - Problem list updated, results console reviewed and updated with pertinent prenatal labs.  - PMH, PSH, medications reviewed and updated as needed  - Return to office in 2 wk(s) for routine prenatal care      Orders:  -     POCT urine dip          Subjective:   Michelle Marion is a 33 y.o.  who presents for routine prenatal care at 33w4d.  Complaints today: No  LOF: No;  VB: No; Contractions: No; FM: Present    Objective:  /62   Wt 84.9 kg (187 lb 3.2 oz)   LMP 2023 (Exact Date)   BMI 31.15 kg/m²     General: Well appearing, no distress  Respiratory: Unlabored breathing  Cardiovascular: Regular rate.  Abdomen: Soft, gravid, nontender  Extremities: Warm and well perfused.  Non tender.    Pregravid Weight/BMI: 85.3 kg (188 lb) (BMI 31.28)  Current Weight: 84.9 kg (187 lb 3.2 oz)   Total Weight Gain: -0.363 kg (-12.8 oz)     Pre-Dave Vitals      Flowsheet Row Most Recent Value   Prenatal Assessment    Fetal Heart Rate 145   Fundal Height (cm) 33 cm   Movement Present   Presentation Vertex   Prenatal Vitals    Blood Pressure 110/62   Weight - Scale 84.9 kg (187 lb 3.2 oz)   Urine Albumin/Glucose    Dilation/Effacement/Station    Vaginal Drainage    Draining Fluid No   Edema    LLE Edema None   RLE Edema None   Facial Edema None             Gera Perera MD  3/12/2024 9:01 AM

## 2024-03-12 NOTE — ASSESSMENT & PLAN NOTE
- Continue  mg PO daily until 36 weeks for pre-eclampsia risk reduction.   - Recommend daily BP monitoring at home. Reviewed parameters. Call if SBP > 140 or DBP > 90. Written instructions provided in AVS today.

## 2024-03-12 NOTE — ASSESSMENT & PLAN NOTE
- PTL/PPROM/Bleeding precautions given. Kick counts reviewed.  - Third trimester labs reviewed.  - Problem list updated, results console reviewed and updated with pertinent prenatal labs.  - PMH, PSH, medications reviewed and updated as needed  - Return to office in 2 wk(s) for routine prenatal care

## 2024-03-28 ENCOUNTER — ROUTINE PRENATAL (OUTPATIENT)
Dept: OBGYN CLINIC | Facility: CLINIC | Age: 34
End: 2024-03-28
Payer: COMMERCIAL

## 2024-03-28 VITALS — SYSTOLIC BLOOD PRESSURE: 110 MMHG | DIASTOLIC BLOOD PRESSURE: 66 MMHG | BODY MASS INDEX: 31.52 KG/M2 | WEIGHT: 189.4 LBS

## 2024-03-28 DIAGNOSIS — Z3A.35 35 WEEKS GESTATION OF PREGNANCY: ICD-10-CM

## 2024-03-28 DIAGNOSIS — A60.09 GENITAL HERPES AFFECTING PREGNANCY IN THIRD TRIMESTER: ICD-10-CM

## 2024-03-28 DIAGNOSIS — O09.523 AMA (ADVANCED MATERNAL AGE) MULTIGRAVIDA 35+, THIRD TRIMESTER: Primary | ICD-10-CM

## 2024-03-28 DIAGNOSIS — O99.820 GROUP B STREPTOCOCCAL CARRIAGE COMPLICATING PREGNANCY: ICD-10-CM

## 2024-03-28 DIAGNOSIS — O98.313 GENITAL HERPES AFFECTING PREGNANCY IN THIRD TRIMESTER: ICD-10-CM

## 2024-03-28 LAB
SL AMB  POCT GLUCOSE, UA: NEGATIVE
SL AMB POCT URINE PROTEIN: NEGATIVE

## 2024-03-28 PROCEDURE — PNV: Performed by: OBSTETRICS & GYNECOLOGY

## 2024-03-28 PROCEDURE — 81002 URINALYSIS NONAUTO W/O SCOPE: CPT | Performed by: OBSTETRICS & GYNECOLOGY

## 2024-03-28 NOTE — PROGRESS NOTES
Routine Prenatal Visit  [facility] - [place]  [address] [place] [address]    Assessment/Plan:  Michelle is a 34 y.o. year old  at 35w6d who presents for routine prenatal visit.     1. AMA (advanced maternal age) multigravida 35+, third trimester    2. Genital herpes affecting pregnancy in third trimester  Assessment & Plan:  Starting valtrex tomorrow      3. Group B streptococcal bacteriuria complicating pregnancy    4. 35 weeks gestation of pregnancy  -     POCT urine dip          Subjective:     CC: Prenatal care    Michelle Marion is a 34 y.o.  female who presents for routine prenatal care at 35w6d.  Pregnancy ROS: no leakage of fluid, pelvic pain, or vaginal bleeding.  normal fetal movement.    The following portions of the patient's history were reviewed and updated as appropriate: allergies, current medications, past family history, past medical history, obstetric history, gynecologic history, past social history, past surgical history and problem list.      Objective:  /66   Wt 85.9 kg (189 lb 6.4 oz)   LMP 2023 (Exact Date)   BMI 31.52 kg/m²   Pregravid Weight/BMI: 85.3 kg (188 lb) (BMI 31.28)  Current Weight: 85.9 kg (189 lb 6.4 oz)   Total Weight Gain: 0.635 kg (1 lb 6.4 oz)   Pre- Vitals      Flowsheet Row Most Recent Value   Prenatal Assessment    Fetal Heart Rate 140   Fundal Height (cm) 36 cm   Movement Present   Presentation Vertex   Prenatal Vitals    Blood Pressure 110/66   Weight - Scale 85.9 kg (189 lb 6.4 oz)   Urine Albumin/Glucose    Dilation/Effacement/Station    Vaginal Drainage    Edema              General: Well appearing, no distress  Respiratory: Unlabored breathing  Cardiovascular: Regular rate.  Abdomen: Soft, gravid, nontender  Fundal Height: Appropriate for gestational age.  Extremities: Warm and well perfused.  Non tender.

## 2024-04-01 LAB
DME PARACHUTE DELIVERY DATE ACTUAL: NORMAL
DME PARACHUTE DELIVERY DATE REQUESTED: NORMAL
DME PARACHUTE DELIVERY NOTE: NORMAL
DME PARACHUTE ITEM DESCRIPTION: NORMAL
DME PARACHUTE ORDER STATUS: NORMAL
DME PARACHUTE SUPPLIER NAME: NORMAL
DME PARACHUTE SUPPLIER PHONE: NORMAL

## 2024-04-05 ENCOUNTER — ROUTINE PRENATAL (OUTPATIENT)
Dept: OBGYN CLINIC | Facility: CLINIC | Age: 34
End: 2024-04-05
Payer: COMMERCIAL

## 2024-04-05 VITALS — SYSTOLIC BLOOD PRESSURE: 128 MMHG | BODY MASS INDEX: 31.48 KG/M2 | DIASTOLIC BLOOD PRESSURE: 70 MMHG | WEIGHT: 189.2 LBS

## 2024-04-05 DIAGNOSIS — O99.013 ANEMIA AFFECTING PREGNANCY IN THIRD TRIMESTER: ICD-10-CM

## 2024-04-05 DIAGNOSIS — A60.09 GENITAL HERPES AFFECTING PREGNANCY IN THIRD TRIMESTER: ICD-10-CM

## 2024-04-05 DIAGNOSIS — O99.343 ANXIETY DURING PREGNANCY IN THIRD TRIMESTER, ANTEPARTUM: ICD-10-CM

## 2024-04-05 DIAGNOSIS — O09.899 PRIOR PREGNANCY COMPLICATED BY PIH, ANTEPARTUM: ICD-10-CM

## 2024-04-05 DIAGNOSIS — O99.820 GROUP B STREPTOCOCCAL CARRIAGE COMPLICATING PREGNANCY: ICD-10-CM

## 2024-04-05 DIAGNOSIS — O99.213 OBESITY AFFECTING PREGNANCY IN THIRD TRIMESTER, UNSPECIFIED OBESITY TYPE: ICD-10-CM

## 2024-04-05 DIAGNOSIS — O09.523 AMA (ADVANCED MATERNAL AGE) MULTIGRAVIDA 35+, THIRD TRIMESTER: ICD-10-CM

## 2024-04-05 DIAGNOSIS — Z3A.37 37 WEEKS GESTATION OF PREGNANCY: Primary | ICD-10-CM

## 2024-04-05 DIAGNOSIS — O98.313 GENITAL HERPES AFFECTING PREGNANCY IN THIRD TRIMESTER: ICD-10-CM

## 2024-04-05 DIAGNOSIS — Z36.85 ANTENATAL SCREENING FOR STREPTOCOCCUS B: ICD-10-CM

## 2024-04-05 DIAGNOSIS — F41.9 ANXIETY DURING PREGNANCY IN THIRD TRIMESTER, ANTEPARTUM: ICD-10-CM

## 2024-04-05 LAB
SL AMB  POCT GLUCOSE, UA: NEGATIVE
SL AMB POCT URINE PROTEIN: NEGATIVE

## 2024-04-05 PROCEDURE — 81002 URINALYSIS NONAUTO W/O SCOPE: CPT | Performed by: OBSTETRICS & GYNECOLOGY

## 2024-04-05 PROCEDURE — PNV: Performed by: OBSTETRICS & GYNECOLOGY

## 2024-04-05 NOTE — PROGRESS NOTES
Routine Prenatal Visit  [facility] [place] [address], [place] [address]    Assessment/Plan:  Michelle is a 34 y.o. year old  at 37w0d who presents for routine prenatal visit.     1. 37 weeks gestation of pregnancy  -     POCT urine dip    2.  screening for streptococcus B    3. Obesity affecting pregnancy in third trimester, unspecified obesity type    4. Prior pregnancy complicated by PIH, antepartum    5. AMA (advanced maternal age) multigravida 35+, third trimester    6. Anxiety during pregnancy in third trimester, antepartum    7. Genital herpes affecting pregnancy in third trimester    8. Group B streptococcal bacteriuria complicating pregnancy    9. Anemia affecting pregnancy in third trimester      + fm  no UTCX consent done.  Started  HSV suppressive.  No out breaks.  + grp B  .  DW pt induction at  39 weeks  considering.      Subjective:     CC: Prenatal care    Michelle Marion is a 34 y.o.  female who presents for routine prenatal care at 37w0d.  Pregnancy ROS: no  leakage of fluid, pelvic pain, or vaginal bleeding.  +  fetal movement.    The following portions of the patient's history were reviewed and updated as appropriate: allergies, current medications, past family history, past medical history, obstetric history, gynecologic history, past social history, past surgical history and problem list.      Objective:  /70   Wt 85.8 kg (189 lb 3.2 oz)   LMP 2023 (Exact Date)   BMI 31.48 kg/m²   Pregravid Weight/BMI: 85.3 kg (188 lb) (BMI 31.28)  Current Weight: 85.8 kg (189 lb 3.2 oz)   Total Weight Gain: 0.544 kg (1 lb 3.2 oz)   Pre- Vitals    Flowsheet Row Most Recent Value   Prenatal Assessment    Fetal Heart Rate 143   Fundal Height (cm) 38 cm   Movement Present   Presentation Vertex   Prenatal Vitals    Blood Pressure 128/70   Weight - Scale 85.8 kg (189 lb 3.2 oz)   Urine Albumin/Glucose    Dilation/Effacement/Station    Vaginal  Drainage    Draining Fluid No   Edema    LLE Edema None   RLE Edema None   Facial Edema None           General: Well appearing, no distress  Respiratory: Unlabored breathing  Cardiovascular: Regular rate.  Abdomen: Soft, gravid, nontender  Fundal Height: Appropriate for gestational age.  Extremities: Warm and well perfused.  Non tender.

## 2024-04-08 PROBLEM — Z3A.37 37 WEEKS GESTATION OF PREGNANCY: Status: ACTIVE | Noted: 2023-11-07

## 2024-04-09 ENCOUNTER — ROUTINE PRENATAL (OUTPATIENT)
Dept: OBGYN CLINIC | Facility: CLINIC | Age: 34
End: 2024-04-09
Payer: COMMERCIAL

## 2024-04-09 ENCOUNTER — TELEPHONE (OUTPATIENT)
Age: 34
End: 2024-04-09

## 2024-04-09 VITALS
WEIGHT: 190.6 LBS | BODY MASS INDEX: 31.75 KG/M2 | DIASTOLIC BLOOD PRESSURE: 68 MMHG | SYSTOLIC BLOOD PRESSURE: 118 MMHG | HEIGHT: 65 IN

## 2024-04-09 DIAGNOSIS — O99.343 ANXIETY DURING PREGNANCY IN THIRD TRIMESTER, ANTEPARTUM: ICD-10-CM

## 2024-04-09 DIAGNOSIS — F41.9 ANXIETY DURING PREGNANCY IN THIRD TRIMESTER, ANTEPARTUM: ICD-10-CM

## 2024-04-09 DIAGNOSIS — O99.013 ANEMIA AFFECTING PREGNANCY IN THIRD TRIMESTER: ICD-10-CM

## 2024-04-09 DIAGNOSIS — O09.899 PRIOR PREGNANCY COMPLICATED BY PIH, ANTEPARTUM: ICD-10-CM

## 2024-04-09 DIAGNOSIS — A60.09 GENITAL HERPES AFFECTING PREGNANCY IN THIRD TRIMESTER: ICD-10-CM

## 2024-04-09 DIAGNOSIS — O99.820 GROUP B STREPTOCOCCAL CARRIAGE COMPLICATING PREGNANCY: ICD-10-CM

## 2024-04-09 DIAGNOSIS — O98.313 GENITAL HERPES AFFECTING PREGNANCY IN THIRD TRIMESTER: ICD-10-CM

## 2024-04-09 DIAGNOSIS — O99.213 OBESITY AFFECTING PREGNANCY IN THIRD TRIMESTER, UNSPECIFIED OBESITY TYPE: ICD-10-CM

## 2024-04-09 DIAGNOSIS — Z3A.37 37 WEEKS GESTATION OF PREGNANCY: ICD-10-CM

## 2024-04-09 DIAGNOSIS — O09.523 AMA (ADVANCED MATERNAL AGE) MULTIGRAVIDA 35+, THIRD TRIMESTER: Primary | ICD-10-CM

## 2024-04-09 LAB
SL AMB  POCT GLUCOSE, UA: NORMAL
SL AMB POCT URINE PROTEIN: NORMAL

## 2024-04-09 PROCEDURE — PNV: Performed by: STUDENT IN AN ORGANIZED HEALTH CARE EDUCATION/TRAINING PROGRAM

## 2024-04-09 PROCEDURE — 81002 URINALYSIS NONAUTO W/O SCOPE: CPT | Performed by: STUDENT IN AN ORGANIZED HEALTH CARE EDUCATION/TRAINING PROGRAM

## 2024-04-09 NOTE — TELEPHONE ENCOUNTER
Pt called in stating she was just notified that fifth disease is going around her sons . Exposure on Thursday/Friday. Pt son is not ill at this time. Pt feels well at this time. She is wondering if there is anything she should be doing/looking out for. Pt aware to call back if she would start to feel ill.

## 2024-04-09 NOTE — ASSESSMENT & PLAN NOTE
- Continue daily home BP monitoring. Instructed to call if SBP>140 or DBP>90. Symptoms of pre-E reviewed.

## 2024-04-09 NOTE — ASSESSMENT & PLAN NOTE
- Labor/Bleeding/ROM precautions given. Kick counts reviewed.   - Reviewed timing of delivery, including option for elective induction of labor as early as 39 weeks versus awaiting spontaneous onset of labor. Patient desires delivery around DARIN.   - Problem list updated, results console reviewed and updated with pertinent prenatal labs.  - PMH, PSH, medications reviewed and updated as needed  - Return to office in 1 wk(s) for routine prenatal care

## 2024-04-09 NOTE — PROGRESS NOTES
"Routine Prenatal Visit  [facility] - [place]  [address], [place] [address]    Assessment/Plan:  Michelle is a 34 y.o. year old  at 37w4d who presents for routine prenatal visit.     1. AMA (advanced maternal age) multigravida 35+, third trimester    2. Prior pregnancy complicated by PIH, antepartum  Assessment & Plan:  - Continue daily home BP monitoring. Instructed to call if SBP>140 or DBP>90. Symptoms of pre-E reviewed.       3. Obesity affecting pregnancy in third trimester, unspecified obesity type    4. Group B streptococcal bacteriuria complicating pregnancy  Assessment & Plan:  - Plan for intrapartum prophylaxis with penicillin.       5. Genital herpes affecting pregnancy in third trimester  Assessment & Plan:  - Continue twice daily Valtrex suppression.       6. Anxiety during pregnancy in third trimester, antepartum  Assessment & Plan:  - Mood stable on Zoloft.       7. Anemia affecting pregnancy in third trimester  Assessment & Plan:  - Continue oral iron supplementation until delivery.      8. 37 weeks gestation of pregnancy  Assessment & Plan:  - Labor/Bleeding/ROM precautions given. Kick counts reviewed.   - Reviewed timing of delivery, including option for elective induction of labor as early as 39 weeks versus awaiting spontaneous onset of labor. Patient desires delivery around DARIN.   - Problem list updated, results console reviewed and updated with pertinent prenatal labs.  - PMH, PSH, medications reviewed and updated as needed  - Return to office in 1 wk(s) for routine prenatal care      Orders:  -     POCT urine dip          Subjective:   Michelle Marion is a 34 y.o.  who presents for routine prenatal care at 37w4d.  Complaints today: No  LOF: No; VB: No; Contractions: No; FM: Present and normal    Objective:  /68 (BP Location: Left arm, Patient Position: Sitting, Cuff Size: Standard) Comment: took it for 2nd time  Ht 5' 5\" (1.651 m)   Wt 86.5 kg (190 lb 9.6 " oz)   LMP 2023 (Exact Date)   BMI 31.72 kg/m²     General: Well appearing, no distress  Respiratory: Unlabored breathing  Cardiovascular: Regular rate.  Abdomen: Soft, gravid, nontender  Extremities: Warm and well perfused.  Non tender.    Pregravid Weight/BMI: 85.3 kg (188 lb) (BMI 31.28)  Current Weight: 86.5 kg (190 lb 9.6 oz)   Total Weight Gain: 1.179 kg (2 lb 9.6 oz)     Pre-Dave Vitals      Flowsheet Row Most Recent Value   Prenatal Assessment    Fetal Heart Rate 145   Fundal Height (cm) 37 cm   Movement Present   Presentation Vertex   Prenatal Vitals    Blood Pressure 118/68  [took it for 2nd time]   Weight - Scale 86.5 kg (190 lb 9.6 oz)   Urine Albumin/Glucose    Dilation/Effacement/Station    Vaginal Drainage    Draining Fluid No   Edema    LLE Edema None   RLE Edema None   Facial Edema None             Gera Perera MD  2024 1:01 PM

## 2024-04-16 ENCOUNTER — ROUTINE PRENATAL (OUTPATIENT)
Dept: OBGYN CLINIC | Facility: CLINIC | Age: 34
End: 2024-04-16
Payer: COMMERCIAL

## 2024-04-16 VITALS
HEIGHT: 65 IN | WEIGHT: 189 LBS | DIASTOLIC BLOOD PRESSURE: 84 MMHG | SYSTOLIC BLOOD PRESSURE: 124 MMHG | BODY MASS INDEX: 31.49 KG/M2

## 2024-04-16 DIAGNOSIS — A60.09 GENITAL HERPES AFFECTING PREGNANCY IN THIRD TRIMESTER: ICD-10-CM

## 2024-04-16 DIAGNOSIS — O98.313 GENITAL HERPES AFFECTING PREGNANCY IN THIRD TRIMESTER: ICD-10-CM

## 2024-04-16 DIAGNOSIS — O99.343 ANXIETY DURING PREGNANCY IN THIRD TRIMESTER, ANTEPARTUM: ICD-10-CM

## 2024-04-16 DIAGNOSIS — O09.899 PRIOR PREGNANCY COMPLICATED BY PIH, ANTEPARTUM: ICD-10-CM

## 2024-04-16 DIAGNOSIS — O09.523 AMA (ADVANCED MATERNAL AGE) MULTIGRAVIDA 35+, THIRD TRIMESTER: ICD-10-CM

## 2024-04-16 DIAGNOSIS — Z3A.38 38 WEEKS GESTATION OF PREGNANCY: ICD-10-CM

## 2024-04-16 DIAGNOSIS — O99.820 GROUP B STREPTOCOCCAL CARRIAGE COMPLICATING PREGNANCY: ICD-10-CM

## 2024-04-16 DIAGNOSIS — O99.213 OBESITY AFFECTING PREGNANCY IN THIRD TRIMESTER, UNSPECIFIED OBESITY TYPE: ICD-10-CM

## 2024-04-16 DIAGNOSIS — F41.9 ANXIETY DURING PREGNANCY IN THIRD TRIMESTER, ANTEPARTUM: ICD-10-CM

## 2024-04-16 DIAGNOSIS — O99.013 ANEMIA AFFECTING PREGNANCY IN THIRD TRIMESTER: Primary | ICD-10-CM

## 2024-04-16 LAB
SL AMB  POCT GLUCOSE, UA: NORMAL
SL AMB POCT URINE PROTEIN: NORMAL

## 2024-04-16 PROCEDURE — 81002 URINALYSIS NONAUTO W/O SCOPE: CPT | Performed by: OBSTETRICS & GYNECOLOGY

## 2024-04-16 PROCEDURE — PNV: Performed by: OBSTETRICS & GYNECOLOGY

## 2024-04-16 NOTE — PROGRESS NOTES
"Routine Prenatal Visit  [facility] - [place] [address], [address], [place] [address]    Assessment/Plan:  Michelle is a 34 y.o. year old  at 38w4d who presents for routine prenatal visit.     1. Anemia affecting pregnancy in third trimester  Assessment & Plan:  Taking po iron        2. AMA (advanced maternal age) multigravida 35+, third trimester    3. Anxiety during pregnancy in third trimester, antepartum  Assessment & Plan:  Stable on Zoloft      4. Genital herpes affecting pregnancy in third trimester  Assessment & Plan:  Taking Valtrex bid      5. Group B streptococcal bacteriuria complicating pregnancy  Assessment & Plan:  Treat in labor      6. Obesity affecting pregnancy in third trimester, unspecified obesity type  Assessment & Plan:  Growth u/s AGA 31wks      7. 38 weeks gestation of pregnancy  -     POCT urine dip    8. Prior pregnancy complicated by PIH, antepartum        Next OB Visit 1 weeks.    Subjective:     CC: Prenatal care    Michelle Marion is a 34 y.o.  female who presents for routine prenatal care at 38w4d.  Pregnancy ROS: no leakage of fluid, pelvic pain, or vaginal bleeding.  normal fetal movement.    The following portions of the patient's history were reviewed and updated as appropriate: allergies, current medications, past family history, past medical history, obstetric history, gynecologic history, past social history, past surgical history and problem list.      Objective:  /84 (BP Location: Right arm, Patient Position: Sitting, Cuff Size: Standard)   Ht 5' 5\" (1.651 m)   Wt 85.7 kg (189 lb)   LMP 2023 (Exact Date)   BMI 31.45 kg/m²   Pregravid Weight/BMI: 85.3 kg (188 lb) (BMI 31.28)  Current Weight: 85.7 kg (189 lb)   Total Weight Gain: 0.454 kg (1 lb)   Pre-Dave Vitals      Flowsheet Row Most Recent Value   Prenatal Assessment    Fetal Heart Rate 155   Fundal Height (cm) 38 cm   Movement Present   Presentation Vertex   Prenatal Vitals  "   Blood Pressure 124/84   Weight - Scale 85.7 kg (189 lb)   Urine Albumin/Glucose    Dilation/Effacement/Station    Cervical Dilation 1   Cervical Effacement 0   Fetal Station 0   Vaginal Drainage    Edema    LLE Edema None   RLE Edema None             General: Well appearing, no distress  Abdomen: Soft, gravid, nontender  Extremities: Non tender.

## 2024-04-16 NOTE — ASSESSMENT & PLAN NOTE
Reviewed with patient that any pregnant women can undergo an elective induction of labor at 39 weeks or later, depending on hospital availability.  One study showed that induction of labor of first time mothers at 39 weeks compared to waiting until 41 weeks, decreased  rate by 3%, and decreased risk of developing high blood pressure in pregnancy.  Alternatively induction of labor can often result in longer in-hospital stays overall, than allowing for spontaneous labor.  This is especially true in patients who have an unfavorable cervix and require the additional step of cervical ripening prior to induction of labor - leading often to an additional 12-24 hours in the hospital prior to delivery, during which there is continuous fetal monitoring.  For all patients who have not gone into labor spontaneously, we recommend induction of labor between 40.6-41.6 weeks, due to increase risk of stillbirth.  Pregnancies past 40.6 weeks who are not in the process of induction, we recommend  testing with NST and MARLENA 2x/week.    Discussed these pros and cons in relation to patient's desires for her delivery process, as well as the fact that she is a healthy patient with no other risk factors.  She wishes to proceed with cervical ripening and induction 2024 if has not delivered.  She has appt next week and will reevaluate cervix then.  Consent reviewed and signed.

## 2024-04-23 ENCOUNTER — ROUTINE PRENATAL (OUTPATIENT)
Dept: OBGYN CLINIC | Facility: CLINIC | Age: 34
End: 2024-04-23
Payer: COMMERCIAL

## 2024-04-23 VITALS
HEIGHT: 65 IN | SYSTOLIC BLOOD PRESSURE: 126 MMHG | DIASTOLIC BLOOD PRESSURE: 84 MMHG | WEIGHT: 191.2 LBS | BODY MASS INDEX: 31.86 KG/M2

## 2024-04-23 DIAGNOSIS — O99.820 GROUP B STREPTOCOCCAL CARRIAGE COMPLICATING PREGNANCY: ICD-10-CM

## 2024-04-23 DIAGNOSIS — O98.313 GENITAL HERPES AFFECTING PREGNANCY IN THIRD TRIMESTER: ICD-10-CM

## 2024-04-23 DIAGNOSIS — A60.09 GENITAL HERPES AFFECTING PREGNANCY IN THIRD TRIMESTER: ICD-10-CM

## 2024-04-23 DIAGNOSIS — Z3A.39 39 WEEKS GESTATION OF PREGNANCY: ICD-10-CM

## 2024-04-23 DIAGNOSIS — O09.523 AMA (ADVANCED MATERNAL AGE) MULTIGRAVIDA 35+, THIRD TRIMESTER: Primary | ICD-10-CM

## 2024-04-23 LAB
SL AMB  POCT GLUCOSE, UA: NORMAL
SL AMB POCT URINE PROTEIN: NORMAL

## 2024-04-23 PROCEDURE — 81002 URINALYSIS NONAUTO W/O SCOPE: CPT | Performed by: OBSTETRICS & GYNECOLOGY

## 2024-04-23 PROCEDURE — PNV: Performed by: OBSTETRICS & GYNECOLOGY

## 2024-04-23 NOTE — PROGRESS NOTES
"Routine Prenatal Visit  [facility] - [place]  [address] [address]    Assessment/Plan:  Michelle is a 34 y.o. year old  at 39w4d who presents for routine prenatal visit.     1. AMA (advanced maternal age) multigravida 35+, third trimester    2. 39 weeks gestation of pregnancy  Assessment & Plan:  Has IOL scheduled for next week. Desires to come in prior to have cervical exam to see if she is dilated any further.     Orders:  -     POCT urine dip    3. Genital herpes affecting pregnancy in third trimester  Assessment & Plan:  Cont valtrex      4. Group B streptococcal bacteriuria complicating pregnancy  Assessment & Plan:  Abx in labor            Subjective:   Michelle Marion is a 34 y.o.  who presents for routine prenatal care at 39w4d.  Complaints today: Denies.   LOF:- ; VB: -; Contractions: -; FM: +    Objective:  /84 (BP Location: Left arm, Patient Position: Sitting, Cuff Size: Standard) Comment: checked for 4th time at end of visit  Ht 5' 5\" (1.651 m)   Wt 86.7 kg (191 lb 3.2 oz)   LMP 2023 (Exact Date)   BMI 31.82 kg/m²     General: Well appearing, no distress  Respiratory: Unlabored breathing  Abdomen: Soft, gravid, nontender  Extremities: Warm and well perfused.  Non tender.    Pregravid Weight/BMI: 85.3 kg (188 lb) (BMI 31.28)  Current Weight: 86.7 kg (191 lb 3.2 oz)   Total Weight Gain: 1.452 kg (3 lb 3.2 oz)     Pre-Dave Vitals      Flowsheet Row Most Recent Value   Prenatal Assessment    Fetal Heart Rate 140   Fundal Height (cm) 39 cm   Movement Present   Presentation Vertex   Prenatal Vitals    Blood Pressure 126/84  [checked for 4th time at end of visit]   Weight - Scale 86.7 kg (191 lb 3.2 oz)   Urine Albumin/Glucose    Dilation/Effacement/Station    Vaginal Drainage    Edema              Ivy Arita DO  2024 9:21 AM    "

## 2024-04-23 NOTE — ASSESSMENT & PLAN NOTE
Has IOL scheduled for next week. Desires to come in prior to have cervical exam to see if she is dilated any further.

## 2024-04-29 ENCOUNTER — HOSPITAL ENCOUNTER (INPATIENT)
Facility: HOSPITAL | Age: 34
LOS: 2 days | Discharge: HOME | End: 2024-05-01
Attending: OBSTETRICS & GYNECOLOGY | Admitting: OBSTETRICS & GYNECOLOGY
Payer: COMMERCIAL

## 2024-04-29 ENCOUNTER — ROUTINE PRENATAL (OUTPATIENT)
Dept: OBGYN CLINIC | Facility: CLINIC | Age: 34
End: 2024-04-29
Payer: COMMERCIAL

## 2024-04-29 ENCOUNTER — HOSPITAL ENCOUNTER (OUTPATIENT)
Dept: LABOR AND DELIVERY | Facility: HOSPITAL | Age: 34
Discharge: HOME | End: 2024-04-29
Payer: COMMERCIAL

## 2024-04-29 VITALS
SYSTOLIC BLOOD PRESSURE: 140 MMHG | BODY MASS INDEX: 31.96 KG/M2 | DIASTOLIC BLOOD PRESSURE: 90 MMHG | WEIGHT: 191.8 LBS | HEIGHT: 65 IN

## 2024-04-29 DIAGNOSIS — O99.343 ANXIETY DURING PREGNANCY IN THIRD TRIMESTER, ANTEPARTUM: ICD-10-CM

## 2024-04-29 DIAGNOSIS — O98.313 GENITAL HERPES AFFECTING PREGNANCY IN THIRD TRIMESTER: ICD-10-CM

## 2024-04-29 DIAGNOSIS — Z3A.40 40 WEEKS GESTATION OF PREGNANCY: ICD-10-CM

## 2024-04-29 DIAGNOSIS — Z34.90 ENCOUNTER FOR ELECTIVE INDUCTION OF LABOR: Primary | ICD-10-CM

## 2024-04-29 DIAGNOSIS — O99.213 OBESITY AFFECTING PREGNANCY IN THIRD TRIMESTER, UNSPECIFIED OBESITY TYPE: Primary | ICD-10-CM

## 2024-04-29 DIAGNOSIS — O99.013 ANEMIA AFFECTING PREGNANCY IN THIRD TRIMESTER: ICD-10-CM

## 2024-04-29 DIAGNOSIS — F41.9 ANXIETY DURING PREGNANCY IN THIRD TRIMESTER, ANTEPARTUM: ICD-10-CM

## 2024-04-29 DIAGNOSIS — O99.820 GROUP B STREPTOCOCCAL CARRIAGE COMPLICATING PREGNANCY: ICD-10-CM

## 2024-04-29 DIAGNOSIS — A60.09 GENITAL HERPES AFFECTING PREGNANCY IN THIRD TRIMESTER: ICD-10-CM

## 2024-04-29 DIAGNOSIS — O09.899 PRIOR PREGNANCY COMPLICATED BY PIH, ANTEPARTUM: ICD-10-CM

## 2024-04-29 DIAGNOSIS — O09.523 AMA (ADVANCED MATERNAL AGE) MULTIGRAVIDA 35+, THIRD TRIMESTER: ICD-10-CM

## 2024-04-29 PROBLEM — Z3A.39 39 WEEKS GESTATION OF PREGNANCY: Status: RESOLVED | Noted: 2023-11-07 | Resolved: 2024-04-29

## 2024-04-29 PROBLEM — O13.9 GESTATIONAL HYPERTENSION: Status: ACTIVE | Noted: 2024-04-29

## 2024-04-29 LAB
ALBUMIN SERPL BCP-MCNC: 3.7 G/DL (ref 3.5–5)
ALP SERPL-CCNC: 131 U/L (ref 34–104)
ALT SERPL W P-5'-P-CCNC: 6 U/L (ref 7–52)
ANION GAP SERPL CALCULATED.3IONS-SCNC: 10 MMOL/L (ref 4–13)
AST SERPL W P-5'-P-CCNC: 12 U/L (ref 13–39)
BILIRUB SERPL-MCNC: 0.44 MG/DL (ref 0.2–1)
BUN SERPL-MCNC: 9 MG/DL (ref 5–25)
CALCIUM SERPL-MCNC: 9.2 MG/DL (ref 8.4–10.2)
CHLORIDE SERPL-SCNC: 105 MMOL/L (ref 96–108)
CO2 SERPL-SCNC: 21 MMOL/L (ref 21–32)
CREAT SERPL-MCNC: 0.42 MG/DL (ref 0.6–1.3)
CREAT UR-MCNC: 34.9 MG/DL
ERYTHROCYTE [DISTWIDTH] IN BLOOD BY AUTOMATED COUNT: 14.5 % (ref 11.6–15.1)
GFR SERPL CREATININE-BSD FRML MDRD: 132 ML/MIN/1.73SQ M
GLUCOSE SERPL-MCNC: 95 MG/DL (ref 65–140)
HCT VFR BLD AUTO: 35.5 % (ref 34.8–46.1)
HGB BLD-MCNC: 11.3 G/DL (ref 11.5–15.4)
MCH RBC QN AUTO: 27.2 PG (ref 26.8–34.3)
MCHC RBC AUTO-ENTMCNC: 31.8 G/DL (ref 31.4–37.4)
MCV RBC AUTO: 86 FL (ref 82–98)
PLATELET # BLD AUTO: 175 THOUSANDS/UL (ref 149–390)
PMV BLD AUTO: 11.3 FL (ref 8.9–12.7)
POTASSIUM SERPL-SCNC: 3.7 MMOL/L (ref 3.5–5.3)
PROT SERPL-MCNC: 6.8 G/DL (ref 6.4–8.4)
PROT UR-MCNC: 4 MG/DL
PROT/CREAT UR: 0.11 MG/G{CREAT} (ref 0–0.1)
RBC # BLD AUTO: 4.15 MILLION/UL (ref 3.81–5.12)
SL AMB  POCT GLUCOSE, UA: NORMAL
SL AMB POCT URINE PROTEIN: NORMAL
SODIUM SERPL-SCNC: 136 MMOL/L (ref 135–147)
WBC # BLD AUTO: 12.68 THOUSAND/UL (ref 4.31–10.16)

## 2024-04-29 PROCEDURE — 81002 URINALYSIS NONAUTO W/O SCOPE: CPT | Performed by: OBSTETRICS & GYNECOLOGY

## 2024-04-29 PROCEDURE — 82570 ASSAY OF URINE CREATININE: CPT | Performed by: OBSTETRICS & GYNECOLOGY

## 2024-04-29 PROCEDURE — 86780 TREPONEMA PALLIDUM: CPT | Performed by: OBSTETRICS & GYNECOLOGY

## 2024-04-29 PROCEDURE — 86850 RBC ANTIBODY SCREEN: CPT | Performed by: OBSTETRICS & GYNECOLOGY

## 2024-04-29 PROCEDURE — 86900 BLOOD TYPING SEROLOGIC ABO: CPT | Performed by: OBSTETRICS & GYNECOLOGY

## 2024-04-29 PROCEDURE — 80053 COMPREHEN METABOLIC PANEL: CPT | Performed by: OBSTETRICS & GYNECOLOGY

## 2024-04-29 PROCEDURE — NC001 PR NO CHARGE: Performed by: OBSTETRICS & GYNECOLOGY

## 2024-04-29 PROCEDURE — 85027 COMPLETE CBC AUTOMATED: CPT | Performed by: OBSTETRICS & GYNECOLOGY

## 2024-04-29 PROCEDURE — 86901 BLOOD TYPING SEROLOGIC RH(D): CPT | Performed by: OBSTETRICS & GYNECOLOGY

## 2024-04-29 PROCEDURE — 84156 ASSAY OF PROTEIN URINE: CPT | Performed by: OBSTETRICS & GYNECOLOGY

## 2024-04-29 PROCEDURE — PNV: Performed by: OBSTETRICS & GYNECOLOGY

## 2024-04-29 RX ORDER — NALBUPHINE HYDROCHLORIDE 10 MG/ML
10 INJECTION, SOLUTION INTRAMUSCULAR; INTRAVENOUS; SUBCUTANEOUS
Status: DISCONTINUED | OUTPATIENT
Start: 2024-04-29 | End: 2024-04-30

## 2024-04-29 RX ORDER — SERTRALINE HYDROCHLORIDE 100 MG/1
100 TABLET, FILM COATED ORAL DAILY
Status: DISCONTINUED | OUTPATIENT
Start: 2024-04-30 | End: 2024-05-01 | Stop reason: HOSPADM

## 2024-04-29 RX ORDER — ONDANSETRON 2 MG/ML
4 INJECTION INTRAMUSCULAR; INTRAVENOUS EVERY 6 HOURS PRN
Status: DISCONTINUED | OUTPATIENT
Start: 2024-04-29 | End: 2024-04-30

## 2024-04-29 RX ORDER — SODIUM CHLORIDE, SODIUM LACTATE, POTASSIUM CHLORIDE, CALCIUM CHLORIDE 600; 310; 30; 20 MG/100ML; MG/100ML; MG/100ML; MG/100ML
125 INJECTION, SOLUTION INTRAVENOUS CONTINUOUS
Status: DISCONTINUED | OUTPATIENT
Start: 2024-04-29 | End: 2024-04-30

## 2024-04-29 RX ORDER — VALACYCLOVIR HYDROCHLORIDE 500 MG/1
500 TABLET, FILM COATED ORAL 2 TIMES DAILY
Status: DISCONTINUED | OUTPATIENT
Start: 2024-04-29 | End: 2024-04-30

## 2024-04-29 RX ORDER — BUPIVACAINE HYDROCHLORIDE 2.5 MG/ML
30 INJECTION, SOLUTION EPIDURAL; INFILTRATION; INTRACAUDAL ONCE AS NEEDED
Status: DISCONTINUED | OUTPATIENT
Start: 2024-04-29 | End: 2024-04-30

## 2024-04-29 RX ADMIN — VALACYCLOVIR 500 MG: 500 TABLET, FILM COATED ORAL at 23:24

## 2024-04-29 NOTE — PROGRESS NOTES
"Routine Prenatal Visit  [facility] - [place] [address], [address], [place] [address]    Assessment/Plan:  Michelle is a 34 y.o. year old  at 40w3d who presents for routine prenatal visit.     1. Obesity affecting pregnancy in third trimester, unspecified obesity type    2. Prior pregnancy complicated by PIH, antepartum      -  Has been monitoring BP at home.  Blood pressure 120/70 at home today.  Denies having any HA/RUQ pain or change in vision    3. AMA (advanced maternal age) multigravida 35+, third trimester    4. Anxiety during pregnancy in third trimester, antepartum    5. Genital herpes affecting pregnancy in third trimester    6. Group B streptococcal bacteriuria complicating pregnancy    7. Anemia affecting pregnancy in third trimester    8. 40 weeks gestation of pregnancy  -     POCT urine dip          -  pt scheduled for cervical ripening today.      Next Visit Post-partum    Subjective:     CC: Prenatal care    Michelle Marion is a 34 y.o.  female who presents for routine prenatal care at 40w3d.  Pregnancy ROS: no leakage of fluid, pelvic pain, or vaginal bleeding.  normal fetal movement.    The following portions of the patient's history were reviewed and updated as appropriate: allergies, current medications, past family history, past medical history, obstetric history, gynecologic history, past social history, past surgical history and problem list.      Objective:  /90 (BP Location: Right arm, Patient Position: Sitting, Cuff Size: Standard)   Ht 5' 5\" (1.651 m)   Wt 87 kg (191 lb 12.8 oz)   LMP 2023 (Exact Date)   BMI 31.92 kg/m²   Pregravid Weight/BMI: 85.3 kg (188 lb) (BMI 31.28)  Current Weight: 87 kg (191 lb 12.8 oz)   Total Weight Gain: 1.724 kg (3 lb 12.8 oz)   Pre- Vitals      Flowsheet Row Most Recent Value   Prenatal Assessment    Fetal Heart Rate 140   Fundal Height (cm) 41 cm   Movement Present   Presentation Vertex   Prenatal Vitals  "   Blood Pressure 140/90   Weight - Scale 87 kg (191 lb 12.8 oz)   Urine Albumin/Glucose    Dilation/Effacement/Station    Cervical Dilation 1   Cervical Effacement 50   Fetal Station -3   Vaginal Drainage    Draining Fluid No   Edema              General: Well appearing, no distress  Abdomen: Soft, gravid, nontender  Extremities: Non tender.

## 2024-04-30 ENCOUNTER — ANESTHESIA (INPATIENT)
Dept: ANESTHESIOLOGY | Facility: HOSPITAL | Age: 34
End: 2024-04-30
Payer: COMMERCIAL

## 2024-04-30 ENCOUNTER — ANESTHESIA EVENT (INPATIENT)
Dept: ANESTHESIOLOGY | Facility: HOSPITAL | Age: 34
End: 2024-04-30
Payer: COMMERCIAL

## 2024-04-30 VITALS
DIASTOLIC BLOOD PRESSURE: 63 MMHG | RESPIRATION RATE: 18 BRPM | TEMPERATURE: 97.1 F | SYSTOLIC BLOOD PRESSURE: 116 MMHG | HEART RATE: 69 BPM

## 2024-04-30 LAB
ABO GROUP BLD: NORMAL
BASE EXCESS BLDCOA CALC-SCNC: -6.6 MMOL/L (ref 3–11)
BASE EXCESS BLDCOV CALC-SCNC: -2.9 MMOL/L (ref 1–9)
BLD GP AB SCN SERPL QL: NEGATIVE
HCO3 BLDCOA-SCNC: 22.8 MMOL/L (ref 17.3–27.3)
HCO3 BLDCOV-SCNC: 22 MMOL/L (ref 12.2–28.6)
O2 CT VFR BLDCOA CALC: 4.2 ML/DL
OXYHGB MFR BLDCOA: 19 %
OXYHGB MFR BLDCOV: 79.3 %
PCO2 BLDCOA: 61.5 MM[HG] (ref 30–60)
PCO2 BLDCOV: 38.7 MM HG (ref 27–43)
PH BLDCOA: 7.19 [PH] (ref 7.23–7.43)
PH BLDCOV: 7.37 [PH] (ref 7.19–7.49)
PO2 BLDCOA: 14.2 MM HG (ref 5–25)
PO2 BLDCOV: 37.4 MM HG (ref 15–45)
RH BLD: POSITIVE
SAO2 % BLDCOV: 18.1 ML/DL
SPECIMEN EXPIRATION DATE: NORMAL
TREPONEMA PALLIDUM IGG+IGM AB [PRESENCE] IN SERUM OR PLASMA BY IMMUNOASSAY: NORMAL

## 2024-04-30 PROCEDURE — 4A1HXCZ MONITORING OF PRODUCTS OF CONCEPTION, CARDIAC RATE, EXTERNAL APPROACH: ICD-10-PCS | Performed by: OBSTETRICS & GYNECOLOGY

## 2024-04-30 PROCEDURE — 88307 TISSUE EXAM BY PATHOLOGIST: CPT | Performed by: PATHOLOGY

## 2024-04-30 PROCEDURE — 0HQ9XZZ REPAIR PERINEUM SKIN, EXTERNAL APPROACH: ICD-10-PCS | Performed by: OBSTETRICS & GYNECOLOGY

## 2024-04-30 PROCEDURE — 82805 BLOOD GASES W/O2 SATURATION: CPT | Performed by: OBSTETRICS & GYNECOLOGY

## 2024-04-30 PROCEDURE — 59400 OBSTETRICAL CARE: CPT | Performed by: OBSTETRICS & GYNECOLOGY

## 2024-04-30 RX ORDER — SIMETHICONE 80 MG
80 TABLET,CHEWABLE ORAL 4 TIMES DAILY PRN
Status: DISCONTINUED | OUTPATIENT
Start: 2024-04-30 | End: 2024-05-01 | Stop reason: HOSPADM

## 2024-04-30 RX ORDER — OXYTOCIN/RINGER'S LACTATE 30/500 ML
1-30 PLASTIC BAG, INJECTION (ML) INTRAVENOUS
Status: DISCONTINUED | OUTPATIENT
Start: 2024-04-30 | End: 2024-04-30

## 2024-04-30 RX ORDER — CALCIUM CARBONATE 500 MG/1
1000 TABLET, CHEWABLE ORAL DAILY PRN
Status: DISCONTINUED | OUTPATIENT
Start: 2024-04-30 | End: 2024-05-01 | Stop reason: HOSPADM

## 2024-04-30 RX ORDER — DOCUSATE SODIUM 100 MG/1
100 CAPSULE, LIQUID FILLED ORAL 2 TIMES DAILY
Status: DISCONTINUED | OUTPATIENT
Start: 2024-04-30 | End: 2024-05-01 | Stop reason: HOSPADM

## 2024-04-30 RX ORDER — ACETAMINOPHEN 325 MG/1
650 TABLET ORAL EVERY 6 HOURS PRN
Status: DISCONTINUED | OUTPATIENT
Start: 2024-04-30 | End: 2024-05-01 | Stop reason: HOSPADM

## 2024-04-30 RX ORDER — LIDOCAINE HYDROCHLORIDE AND EPINEPHRINE 15; 5 MG/ML; UG/ML
INJECTION, SOLUTION EPIDURAL AS NEEDED
Status: DISCONTINUED | OUTPATIENT
Start: 2024-04-30 | End: 2024-04-30 | Stop reason: HOSPADM

## 2024-04-30 RX ORDER — OXYTOCIN/RINGER'S LACTATE 30/500 ML
125 PLASTIC BAG, INJECTION (ML) INTRAVENOUS ONCE
Status: COMPLETED | OUTPATIENT
Start: 2024-04-30 | End: 2024-04-30

## 2024-04-30 RX ORDER — LIDOCAINE HYDROCHLORIDE AND EPINEPHRINE 15; 5 MG/ML; UG/ML
INJECTION, SOLUTION EPIDURAL AS NEEDED
Status: DISCONTINUED | OUTPATIENT
Start: 2024-04-30 | End: 2024-04-30

## 2024-04-30 RX ORDER — OXYTOCIN/RINGER'S LACTATE 30/500 ML
250 PLASTIC BAG, INJECTION (ML) INTRAVENOUS ONCE
Status: COMPLETED | OUTPATIENT
Start: 2024-04-30 | End: 2024-04-30

## 2024-04-30 RX ORDER — MAGNESIUM HYDROXIDE/ALUMINUM HYDROXICE/SIMETHICONE 120; 1200; 1200 MG/30ML; MG/30ML; MG/30ML
15 SUSPENSION ORAL EVERY 6 HOURS PRN
Status: DISCONTINUED | OUTPATIENT
Start: 2024-04-30 | End: 2024-05-01 | Stop reason: HOSPADM

## 2024-04-30 RX ORDER — BENZOCAINE/MENTHOL 6 MG-10 MG
1 LOZENGE MUCOUS MEMBRANE DAILY PRN
Status: DISCONTINUED | OUTPATIENT
Start: 2024-04-30 | End: 2024-05-01 | Stop reason: HOSPADM

## 2024-04-30 RX ORDER — DIPHENHYDRAMINE HCL 25 MG
25 TABLET ORAL EVERY 6 HOURS PRN
Status: DISCONTINUED | OUTPATIENT
Start: 2024-04-30 | End: 2024-05-01 | Stop reason: HOSPADM

## 2024-04-30 RX ORDER — LIDOCAINE HYDROCHLORIDE 10 MG/ML
INJECTION, SOLUTION EPIDURAL; INFILTRATION; INTRACAUDAL; PERINEURAL AS NEEDED
Status: DISCONTINUED | OUTPATIENT
Start: 2024-04-30 | End: 2024-04-30 | Stop reason: HOSPADM

## 2024-04-30 RX ORDER — BISACODYL 10 MG
10 SUPPOSITORY, RECTAL RECTAL DAILY PRN
Status: DISCONTINUED | OUTPATIENT
Start: 2024-04-30 | End: 2024-05-01 | Stop reason: HOSPADM

## 2024-04-30 RX ORDER — ROPIVACAINE HYDROCHLORIDE 2 MG/ML
INJECTION, SOLUTION EPIDURAL; INFILTRATION; PERINEURAL AS NEEDED
Status: DISCONTINUED | OUTPATIENT
Start: 2024-04-30 | End: 2024-04-30 | Stop reason: HOSPADM

## 2024-04-30 RX ORDER — IBUPROFEN 600 MG/1
600 TABLET ORAL EVERY 6 HOURS PRN
Status: DISCONTINUED | OUTPATIENT
Start: 2024-04-30 | End: 2024-05-01 | Stop reason: HOSPADM

## 2024-04-30 RX ORDER — ROPIVACAINE HYDROCHLORIDE 2 MG/ML
10 INJECTION, SOLUTION EPIDURAL; INFILTRATION; PERINEURAL CONTINUOUS
Status: DISCONTINUED | OUTPATIENT
Start: 2024-04-30 | End: 2024-04-30

## 2024-04-30 RX ADMIN — NALBUPHINE HYDROCHLORIDE 10 MG: 10 INJECTION, SOLUTION INTRAMUSCULAR; INTRAVENOUS; SUBCUTANEOUS at 03:20

## 2024-04-30 RX ADMIN — Medication 125 MILLI-UNITS/MIN: at 15:25

## 2024-04-30 RX ADMIN — DOCUSATE SODIUM 100 MG: 100 CAPSULE, LIQUID FILLED ORAL at 17:21

## 2024-04-30 RX ADMIN — LIDOCAINE HYDROCHLORIDE 3 ML: 10 INJECTION, SOLUTION EPIDURAL; INFILTRATION; INTRACAUDAL; PERINEURAL at 09:11

## 2024-04-30 RX ADMIN — ROPIVACAINE HYDROCHLORIDE: 2 INJECTION, SOLUTION EPIDURAL; INFILTRATION at 09:59

## 2024-04-30 RX ADMIN — SERTRALINE 100 MG: 100 TABLET, FILM COATED ORAL at 09:38

## 2024-04-30 RX ADMIN — VALACYCLOVIR 500 MG: 500 TABLET, FILM COATED ORAL at 09:38

## 2024-04-30 RX ADMIN — ACETAMINOPHEN 650 MG: 325 TABLET, FILM COATED ORAL at 19:22

## 2024-04-30 RX ADMIN — BENZOCAINE AND LEVOMENTHOL 1 APPLICATION: 200; 5 SPRAY TOPICAL at 15:59

## 2024-04-30 RX ADMIN — LIDOCAINE HYDROCHLORIDE,EPINEPHRINE BITARTRATE 2 ML: 15; .005 INJECTION, SOLUTION EPIDURAL; INFILTRATION; INTRACAUDAL; PERINEURAL at 09:19

## 2024-04-30 RX ADMIN — Medication 2 MILLI-UNITS/MIN: at 06:20

## 2024-04-30 RX ADMIN — SODIUM CHLORIDE 2.5 MILLION UNITS: 9 INJECTION, SOLUTION INTRAVENOUS at 04:30

## 2024-04-30 RX ADMIN — IBUPROFEN 600 MG: 600 TABLET, FILM COATED ORAL at 23:07

## 2024-04-30 RX ADMIN — WITCH HAZEL 1 PAD: 500 SOLUTION RECTAL; TOPICAL at 15:59

## 2024-04-30 RX ADMIN — SODIUM CHLORIDE 5 MILLION UNITS: 0.9 INJECTION, SOLUTION INTRAVENOUS at 00:14

## 2024-04-30 RX ADMIN — LIDOCAINE HYDROCHLORIDE,EPINEPHRINE BITARTRATE 3 ML: 15; .005 INJECTION, SOLUTION EPIDURAL; INFILTRATION; INTRACAUDAL; PERINEURAL at 09:16

## 2024-04-30 RX ADMIN — SODIUM CHLORIDE, SODIUM LACTATE, POTASSIUM CHLORIDE, AND CALCIUM CHLORIDE 125 ML/HR: .6; .31; .03; .02 INJECTION, SOLUTION INTRAVENOUS at 00:22

## 2024-04-30 RX ADMIN — SODIUM CHLORIDE 2.5 MILLION UNITS: 9 INJECTION, SOLUTION INTRAVENOUS at 08:40

## 2024-04-30 RX ADMIN — SODIUM CHLORIDE, SODIUM LACTATE, POTASSIUM CHLORIDE, AND CALCIUM CHLORIDE 125 ML/HR: .6; .31; .03; .02 INJECTION, SOLUTION INTRAVENOUS at 08:33

## 2024-04-30 RX ADMIN — DOXYLAMINE SUCCINATE 25 MG: 25 TABLET ORAL at 00:14

## 2024-04-30 RX ADMIN — ROPIVACAINE HYDROCHLORIDE 5 ML: 2 INJECTION EPIDURAL; INFILTRATION; PERINEURAL at 09:23

## 2024-04-30 RX ADMIN — IBUPROFEN 600 MG: 600 TABLET, FILM COATED ORAL at 17:24

## 2024-04-30 RX ADMIN — Medication 250 MILLI-UNITS/MIN: at 13:04

## 2024-04-30 NOTE — OB LABOR/OXYTOCIN SAFETY PROGRESS
Labor Progress Note - Michelle Marion 34 y.o. female MRN: [id number]    Unit/Bed#: -01 Encounter: [id number]    Dose (michael-units/min) Oxytocin: 8 michael-units/min  Contraction Frequency (minutes): 2-5  Contraction Intensity: Mild  Uterine Activity Characteristics: Regular    Cervical Dilation: 4  Cervical Effacement: 60  Fetal Station: -2    Baseline Rate (FHR): 145 bpm  Fetal Heart Rate (FHT): 125 BPM  FHR Category: 1               Vital Signs:   Vitals:    04/30/24 1014   BP: 116/61   Pulse: 85   Resp:    Temp:    SpO2:        Notes/comments:     Received epidural and comfortable.  Continue with pitocin induction.      Kathrine Perez MD 4/30/2024 10:30 AM

## 2024-04-30 NOTE — PLAN OF CARE
Problem: PAIN - ADULT  Goal: Verbalizes/displays adequate comfort level or baseline comfort level  Description: Interventions:  - Encourage patient to monitor pain and request assistance  - Assess pain using appropriate pain scale  - Administer analgesics based on type and severity of pain and evaluate response  - Implement non-pharmacological measures as appropriate and evaluate response  - Consider cultural and social influences on pain and pain management  - Notify physician/advanced practitioner if interventions unsuccessful or patient reports new pain  4/30/2024 1514 by Trey Eastman RN  Outcome: Progressing  4/30/2024 1511 by Trey Eastman RN  Outcome: Progressing     Problem: INFECTION - ADULT  Goal: Absence or prevention of progression during hospitalization  Description: INTERVENTIONS:  - Assess and monitor for signs and symptoms of infection  - Monitor lab/diagnostic results  - Monitor all insertion sites, i.e. indwelling lines, tubes, and drains  - Monitor endotracheal if appropriate and nasal secretions for changes in amount and color  - Cherry [place] appropriate cooling/warming therapies per order  - Administer medications as ordered  - Instruct and encourage patient and family to use good hand hygiene technique  - Identify and instruct in appropriate isolation precautions for identified infection/condition  4/30/2024 1514 by Trey Eastman RN  Outcome: Progressing  4/30/2024 1511 by Trey Eastman RN  Outcome: Progressing  Goal: Absence of fever/infection during neutropenic period  Description: INTERVENTIONS:  - Monitor WBC    4/30/2024 1514 by Trey Eastman RN  Outcome: Progressing  4/30/2024 1511 by Trey Eastamn RN  Outcome: Progressing     Problem: SAFETY ADULT  Goal: Patient will remain free of falls  Description: INTERVENTIONS:  - Educate patient/family on patient safety including physical limitations  - Instruct patient to call for assistance with activity   - Consult OT/PT  to assist with strengthening/mobility   - Keep Call bell within reach  - Keep bed low and locked with side rails adjusted as appropriate  - Keep care items and personal belongings within reach  - Initiate and maintain comfort rounds  - Make Fall Risk Sign visible to staff  - Apply yellow socks and bracelet for high fall risk patients  - Consider moving patient to room near nurses station  4/30/2024 1514 by Trey Eastman RN  Outcome: Progressing  4/30/2024 1511 by Trey Eastman RN  Outcome: Progressing  Goal: Maintain or return to baseline ADL function  Description: INTERVENTIONS:  -  Assess patient's ability to carry out ADLs; assess patient's baseline for ADL function and identify physical deficits which impact ability to perform ADLs (bathing, care of mouth/teeth, toileting, grooming, dressing, etc.)  - Assess/evaluate cause of self-care deficits   - Assess range of motion  - Assess patient's mobility; develop plan if impaired  - Assess patient's need for assistive devices and provide as appropriate  - Encourage maximum independence but intervene and supervise when necessary  - Involve family in performance of ADLs  - Assess for home care needs following discharge   - Consider OT consult to assist with ADL evaluation and planning for discharge  - Provide patient education as appropriate  4/30/2024 1514 by Trey Eastman RN  Outcome: Progressing  4/30/2024 1511 by Trey Eastman RN  Outcome: Progressing  Goal: Maintains/Returns to pre admission functional level  Description: INTERVENTIONS:  - Perform AM-PAC 6 Click Basic Mobility/ Daily Activity assessment daily.  - Set and communicate daily mobility goal to care team and patient/family/caregiver.   - Collaborate with rehabilitation services on mobility goals if consulted  - Out of bed for toileting  - Record patient progress and toleration of activity level   4/30/2024 1514 by Trye Eastman RN  Outcome: Progressing  4/30/2024 1511 by Trey  STEPHANIE Eastman  Outcome: Progressing     Problem: Knowledge Deficit  Goal: Patient/family/caregiver demonstrates understanding of disease process, treatment plan, medications, and discharge instructions  Description: Complete learning assessment and assess knowledge base.  Interventions:  - Provide teaching at level of understanding  - Provide teaching via preferred learning methods  4/30/2024 1514 by Trey Eastman RN  Outcome: Progressing  4/30/2024 1511 by Trey Eastman RN  Outcome: Progressing     Problem: DISCHARGE PLANNING  Goal: Discharge to home or other facility with appropriate resources  Description: INTERVENTIONS:  - Identify barriers to discharge w/patient and caregiver  - Arrange for needed discharge resources and transportation as appropriate  - Identify discharge learning needs (meds, wound care, etc.)  - Arrange for interpretive services to assist at discharge as needed  - Refer to Case Management Department for coordinating discharge planning if the patient needs post-hospital services based on physician/advanced practitioner order or complex needs related to functional status, cognitive ability, or social support system  4/30/2024 1514 by Trey Eastman RN  Outcome: Progressing  4/30/2024 1511 by Trey Eastman RN  Outcome: Progressing     Problem: Knowledge Deficit  Goal: Verbalizes understanding of labor plan  Description: Assess patient/family/caregiver's baseline knowledge level and ability to understand information.  Provide education via patient/family/caregiver's preferred learning method at appropriate level of understanding.     1. Provide teaching at level of understanding.  2. Provide teaching via preferred learning method(s).  4/30/2024 1511 by Trey Eastman RN  Outcome: Completed  4/30/2024 0703 by Trey Eastman RN  Outcome: Progressing     Problem: Labor & Delivery  Goal: Manages discomfort  Description: Assess and monitor for signs and symptoms of discomfort.   Assess patient's pain level regularly and per hospital policy.  Administer medications as ordered. Support use of nonpharmacological methods to help control pain such as distraction, imagery, relaxation, and application of heat and cold.  Collaborate with interdisciplinary team and patient to determine appropriate pain management plan.    1. Include patient in decisions related to comfort.  2. Offer non-pharmacological pain management interventions.  3. Report ineffective pain management to physician.  4/30/2024 1511 by Trey Eastman RN  Outcome: Completed  4/30/2024 0703 by Trey Eastman RN  Outcome: Progressing  Goal: Patient vital signs are stable  Description: 1. Assess vital signs - vaginal delivery.  4/30/2024 1511 by Trey Eastman RN  Outcome: Completed  4/30/2024 0703 by Trey Eastman RN  Outcome: Progressing

## 2024-04-30 NOTE — PLAN OF CARE
Problem: PAIN - ADULT  Goal: Verbalizes/displays adequate comfort level or baseline comfort level  Description: Interventions:  - Encourage patient to monitor pain and request assistance  - Assess pain using appropriate pain scale  - Administer analgesics based on type and severity of pain and evaluate response  - Implement non-pharmacological measures as appropriate and evaluate response  - Consider cultural and social influences on pain and pain management  - Notify physician/advanced practitioner if interventions unsuccessful or patient reports new pain  Outcome: Progressing     Problem: INFECTION - ADULT  Goal: Absence or prevention of progression during hospitalization  Description: INTERVENTIONS:  - Assess and monitor for signs and symptoms of infection  - Monitor lab/diagnostic results  - Monitor all insertion sites, i.e. indwelling lines, tubes, and drains  - Monitor endotracheal if appropriate and nasal secretions for changes in amount and color  - [place] appropriate cooling/warming therapies per order  - Administer medications as ordered  - Instruct and encourage patient and family to use good hand hygiene technique  - Identify and instruct in appropriate isolation precautions for identified infection/condition  Outcome: Progressing  Goal: Absence of fever/infection during neutropenic period  Description: INTERVENTIONS:  - Monitor WBC    Outcome: Progressing     Problem: SAFETY ADULT  Goal: Patient will remain free of falls  Description: INTERVENTIONS:  - Educate patient/family on patient safety including physical limitations  - Instruct patient to call for assistance with activity   - Consult OT/PT to assist with strengthening/mobility   - Keep Call bell within reach  - Keep bed low and locked with side rails adjusted as appropriate  - Keep care items and personal belongings within reach  - Initiate and maintain comfort rounds  - Make Fall Risk Sign visible to staff  - Apply yellow socks and bracelet  for high fall risk patients  - Consider moving patient to room near nurses station  Outcome: Progressing  Goal: Maintain or return to baseline ADL function  Description: INTERVENTIONS:  -  Assess patient's ability to carry out ADLs; assess patient's baseline for ADL function and identify physical deficits which impact ability to perform ADLs (bathing, care of mouth/teeth, toileting, grooming, dressing, etc.)  - Assess/evaluate cause of self-care deficits   - Assess range of motion  - Assess patient's mobility; develop plan if impaired  - Assess patient's need for assistive devices and provide as appropriate  - Encourage maximum independence but intervene and supervise when necessary  - Involve family in performance of ADLs  - Assess for home care needs following discharge   - Consider OT consult to assist with ADL evaluation and planning for discharge  - Provide patient education as appropriate  Outcome: Progressing  Goal: Maintains/Returns to pre admission functional level  Description: INTERVENTIONS:  - Perform AM-PAC 6 Click Basic Mobility/ Daily Activity assessment daily.  - Set and communicate daily mobility goal to care team and patient/family/caregiver.   - Collaborate with rehabilitation services on mobility goals if consulted  - Out of bed for toileting  - Record patient progress and toleration of activity level   Outcome: Progressing     Problem: Knowledge Deficit  Goal: Patient/family/caregiver demonstrates understanding of disease process, treatment plan, medications, and discharge instructions  Description: Complete learning assessment and assess knowledge base.  Interventions:  - Provide teaching at level of understanding  - Provide teaching via preferred learning methods  Outcome: Progressing     Problem: DISCHARGE PLANNING  Goal: Discharge to home or other facility with appropriate resources  Description: INTERVENTIONS:  - Identify barriers to discharge w/patient and caregiver  - Arrange for needed  discharge resources and transportation as appropriate  - Identify discharge learning needs (meds, wound care, etc.)  - Arrange for interpretive services to assist at discharge as needed  - Refer to Case Management Department for coordinating discharge planning if the patient needs post-hospital services based on physician/advanced practitioner order or complex needs related to functional status, cognitive ability, or social support system  Outcome: Progressing     Problem: Knowledge Deficit  Goal: Verbalizes understanding of labor plan  Description: Assess patient/family/caregiver's baseline knowledge level and ability to understand information.  Provide education via patient/family/caregiver's preferred learning method at appropriate level of understanding.     1. Provide teaching at level of understanding.  2. Provide teaching via preferred learning method(s).  4/30/2024 1511 by Trey Eastman RN  Outcome: Completed  4/30/2024 0703 by Trey Eastman RN  Outcome: Progressing     Problem: Labor & Delivery  Goal: Manages discomfort  Description: Assess and monitor for signs and symptoms of discomfort.  Assess patient's pain level regularly and per hospital policy.  Administer medications as ordered. Support use of nonpharmacological methods to help control pain such as distraction, imagery, relaxation, and application of heat and cold.  Collaborate with interdisciplinary team and patient to determine appropriate pain management plan.    1. Include patient in decisions related to comfort.  2. Offer non-pharmacological pain management interventions.  3. Report ineffective pain management to physician.  4/30/2024 1511 by Trey Eastman RN  Outcome: Completed  4/30/2024 0703 by Trey Eastman RN  Outcome: Progressing  Goal: Patient vital signs are stable  Description: 1. Assess vital signs - vaginal delivery.  4/30/2024 1511 by Trey Eastman RN  Outcome: Completed  4/30/2024 0703 by Trey Eastman  RN  Outcome: Progressing

## 2024-04-30 NOTE — OB LABOR/OXYTOCIN SAFETY PROGRESS
Labor Progress Note - Michelle Marion 34 y.o. female MRN: [id number]    Unit/Bed#: -01 Encounter: [id number]    Dose (michael-units/min) Oxytocin: 12 michael-units/min  Contraction Frequency (minutes): 2-3  Contraction Intensity: Mild  Uterine Activity Characteristics: Regular    Cervical Dilation: 5  Cervical Effacement: 70  Fetal Station: -1    FHT baseline: 140bpm  Variability: moderate  Accels: present  Decels: Earlies  Category 1      Vital Signs:   Vitals:    04/30/24 1158   BP: 107/61   Pulse: 71   Resp:    Temp:    SpO2:        Notes/comments:     Patient comfortable with epidural. Still latent labor.  Continue pitocin.      Kathrine Perez MD 4/30/2024 12:10 PM

## 2024-04-30 NOTE — ANESTHESIA PROCEDURE NOTES
Epidural Block    Patient location during procedure: OB/L&D  Start time: 4/30/2024 9:15 AM  Reason for block: procedure for pain and at surgeon's request  Staffing  Performed by: Ingrid Hyatt MD  Authorized by: Ingrid Hyatt MD    Preanesthetic Checklist  Completed: patient identified, IV checked, site marked, risks and benefits discussed, surgical consent, monitors and equipment checked, pre-op evaluation and timeout performed  Epidural  Patient position: sitting  Prep: ChloraPrep  Sedation Level: no sedation  Patient monitoring: frequent blood pressure checks, continuous pulse oximetry and heart rate  Approach: midline  Location: lumbar, L3-4  Injection technique: SHENA saline and SHENA air  Needle  Needle type: Tuohy   Needle gauge: 17 G  Needle insertion depth: 6 cm  Catheter type: multi-orifice  Catheter size: 20 G  Catheter at skin depth: 11 cm  Catheter securement method: stabilization device, clear occlusive dressing and tape  Test dose: negative  Assessment  Sensory level: T10  Number of attempts: 1negative aspiration for CSF, negative aspiration for heme and no paresthesia on injection  patient tolerated the procedure well with no immediate complications  Additional Notes  Straightforward with 1 pass

## 2024-04-30 NOTE — ANESTHESIA PREPROCEDURE EVALUATION
Procedure:  LABOR ANALGESIA    Relevant Problems   CARDIO   (+) Gestational hypertension      GYN   (+) AMA (advanced maternal age) multigravida 35+, third trimester      HEMATOLOGY   (+) Anemia affecting pregnancy in third trimester                Latest Reference Range & Units 04/29/24 23:01   WBC 4.31 - 10.16 Thousand/uL 12.68 (H)   RBC 3.81 - 5.12 Million/uL 4.15   Hemoglobin 11.5 - 15.4 g/dL 11.3 (L)   Hematocrit 34.8 - 46.1 % 35.5   MCV 82 - 98 fL 86   MCH 26.8 - 34.3 pg 27.2   MCHC 31.4 - 37.4 g/dL 31.8   RDW 11.6 - 15.1 % 14.5   Platelet Count 149 - 390 Thousands/uL 175   MPV 8.9 - 12.7 fL 11.3   (H): Data is abnormally high  (L): Data is abnormally low        Anesthesia Plan  ASA Score- 2     Anesthesia Type- epidural with ASA Monitors.         Additional Monitors:     Airway Plan:            Plan Factors-    Induction-     Postoperative Plan-     Informed Consent-

## 2024-04-30 NOTE — OB LABOR/OXYTOCIN SAFETY PROGRESS
Labor Progress Note - Michelle Marion 34 y.o. female MRN: [id number]    Unit/Bed#: -01 Encounter: [id number]    Dose (michael-units/min) Oxytocin: 6 michael-units/min  Contraction Frequency (minutes): 4-5  Contraction Intensity: Mild  Uterine Activity Characteristics: Regular    Cervical Dilation: 4  Cervical Effacement: 60  Fetal Station: -2    Baseline Rate (FHR): 130 bpm  Fetal Heart Rate (FHT): 125 BPM  FHR Category: 1               Vital Signs:   Vitals:    24 0803   BP: 121/69   Pulse: 76   Resp:    Temp:        Notes/comments:       Assuming care of patient - Michelle is  at 40.4wks who presented for induction of labor last night.  She had a dela cruz balloon place for cervical ripening which is now out.  Pitocin has been started, as well at [facility] for GBS prophylaxis.  She has had some mildly elevated BP since admission with normal labs and no signs of preeclampsia.  She meets criteria for gHTN and we will continue to monitor closely.  She had gHTN with last pregnancy as well.      AROM'd for clear fluid.  She is interested in epidural, will notify anesthesia (who is currently in another room).    Kathrine Perez MD 2024 8:16 AM

## 2024-04-30 NOTE — ANESTHESIA POSTPROCEDURE EVALUATION
Post-Op Assessment Note    CV Status:  Stable    Pain management: adequate       Mental Status:  Alert and awake   Hydration Status:  Euvolemic   PONV Controlled:  Controlled   Airway Patency:  Patent     Post Op Vitals Reviewed: Yes    No anethesia notable event occurred.    Staff: Anesthesiologist         Vitals:    04/30/24 1724   BP: 134/78   Pulse: 91   Resp: 18   Temp: 98 °F (36.7 °C)   SpO2: 99%

## 2024-04-30 NOTE — H&P
History & Physical - OB/GYN   Michelle Marion 34 y.o. female MRN: [id number]  Unit/Bed#: -01 Encounter: [id number]    34 y.o. yo  at 40w3d weeks presents for IOL. She meets criteria for GHTN today.     Pregnancy Complications:  Patient Active Problem List   Diagnosis    Obesity affecting pregnancy in third trimester    Prior pregnancy complicated by PIH, antepartum    AMA (advanced maternal age) multigravida 35+, third trimester    Anxiety during pregnancy in third trimester, antepartum    Genital herpes affecting pregnancy    Group B streptococcal bacteriuria complicating pregnancy    Anemia affecting pregnancy in third trimester    Encounter for elective induction of labor    Gestational hypertension     PMH:  Past Medical History:   Diagnosis Date    Anxiety     History of abnormal cervical Pap smear     +HPV    History of ovarian cyst     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      PSH:  Past Surgical History:   Procedure Laterality Date    TONSILECTOMY AND ADNOIDECTOMY      WISDOM TOOTH EXTRACTION         Social Hx:  Social History     Socioeconomic History    Marital status: /Civil Union     Spouse name: Not on file    Number of children: Not on file    Years of education: Not on file    Highest education level: Not on file   Occupational History    Not on file   Tobacco Use    Smoking status: Never    Smokeless tobacco: Never   Vaping Use    Vaping status: Never Used   Substance and Sexual Activity    Alcohol use: Not Currently    Drug use: Never    Sexual activity: Yes     Birth control/protection: None   Other Topics Concern    Not on file   Social History Narrative    Not on file     Social Determinants of Health     Financial Resource Strain: Not on file   Food Insecurity: No Food Insecurity (3/26/2024)    Hunger Vital Sign     Worried About Running Out of Food in the Last Year: Never true     Ran Out of Food in the Last Year: Never true  "  Transportation Needs: No Transportation Needs (3/26/2024)    PRAPARE - Transportation     Lack of Transportation (Medical): No     Lack of Transportation (Non-Medical): No   Physical Activity: Not on file   Stress: Not on file   Social Connections: Not on file   Intimate Partner Violence: Not on file   Housing Stability: Low Risk  (3/26/2024)    Housing Stability Vital Sign     Unable to Pay for Housing in the Last Year: No     Number of Places Lived in the Last Year: 1     Unstable Housing in the Last Year: No         OB Hx:  OB History    Para Term  AB Living   3 1 1 0 1 1   SAB IAB Ectopic Multiple Live Births   1 0 0 0 1      # Outcome Date GA Lbr Ashok/2nd Weight Sex Delivery Anes PTL Lv   3 Current            2 SAB 2022 9w0d             Birth Comments: MAB-Induced with Misoprostol/Retained POC   1 Term 20 41w0d  3941 g (8 lb 11 oz) M Vag-Spont EPI N INDIRA      Birth Comments: gHTN without significant Proteinuria, First degree laceration       Meds:  No current facility-administered medications on file prior to encounter.     Current Outpatient Medications on File Prior to Encounter   Medication Sig Dispense Refill    Ascorbic Acid (Vitamin C) 100 MG CHEW Chew      Ferrous Sulfate ER 50 MG TBCR Take by mouth      Prenatal-FE Bis-FA-DHA w/o A (COMPLETE PRENATAL/DHA PO) Take by mouth Natiure's Bounty with DHA (43 mg) 8 mg EPA      sertraline (ZOLOFT) 100 mg tablet Take 100 mg by mouth daily      valACYclovir (VALTREX) 500 mg tablet Take 1 tablet (500 mg total) by mouth 2 (two) times a day 30 tablet 1       Allergies:  No Known Allergies    Labs:  Blood type: A+  Antibody: negative  Group B strep: positive  HIV: negative  Hepatitis B: negative  RPR: neg  Rubella: Immune  1 hour Glucose: 89    Physical Exam:  /97 (BP Location: Left arm)   Pulse 88   Temp (!) 97.3 °F (36.3 °C) (Temporal)   Resp 18   Ht 5' 5\" (1.651 m)   Wt 86.6 kg (191 lb)   LMP 2023 (Exact Date)   BMI 31.78 " kg/m²       HEENT: atraumatic, normocephalic  Lungs: normal effort  Abdomen: gravid, non-tender, non-distended  Extremities: non-tender, no edema    Estimated Fetal Weight: 8.5 lbs  Presentation: vertex by scan    SVE: 2.5 / 50% / -3  FHT:  140 / Moderate 6 - 25 bpm / +accels, no decels  Plummer: irregular    Membranes: intact    Assessment:   34 y.o.  at 40w3d weeks presents for IOL, meets criteria for GHTN.     Plan:   1. Admit to L&D  2. CBC, RPR, type and screen  3. GBS +: PCN ordered  4. Bps mild range, will monitor  5. FB placed and inflated with 60cc sterile fluid.

## 2024-04-30 NOTE — OB LABOR/OXYTOCIN SAFETY PROGRESS
Labor Progress Note - Michelle Marion 34 y.o. female MRN: [id number]    Unit/Bed#: -01 Encounter: [id number]    Dose (michael-units/min) Oxytocin: 12 michael-units/min  Contraction Frequency (minutes): 2-3  Contraction Intensity: Mild  Uterine Activity Characteristics: Regular    Cervical Dilation: 10  Dilation Complete Date: 04/30/24  Dilation Complete Time: 1240  Cervical Effacement: 100  Fetal Station: 1    FHT baseline: 140bpm  Variability: moderate  Accels: present  Decels: Variables: intermittent  Category 2        Vital Signs:   Vitals:    04/30/24 1229   BP: 100/57   Pulse: 69   Resp:    Temp:    SpO2:        Notes/comments:     Complete and +1.  Patient calling her mom to come for pushing.  Will wait to start pushing as long as FHT reassuring.    Kathrine Perez MD 4/30/2024 12:42 PM

## 2024-04-30 NOTE — OB LABOR/OXYTOCIN SAFETY PROGRESS
Labor Progress Note - Michelle Marion 34 y.o. female MRN: [id number]    Unit/Bed#: -01 Encounter: [id number]       Contraction Frequency (minutes): irregular  Contraction Intensity: Mild/Moderate  Uterine Activity Characteristics: Regular  Cervical Dilation: 4        Cervical Effacement: 60  Fetal Station: -2  Baseline Rate (FHR): 125 bpm  Fetal Heart Rate (FHT): 150 BPM  FHR Category: 1           Vital Signs:   Vitals:    04/29/24 2050   BP: 141/97   Pulse: 88   Resp: 18   Temp: (!) 97.3 °F (36.3 °C)       Notes/comments:   FB out. Will start pitocin.       Arlette Nelson MD 4/30/2024 5:39 AM

## 2024-04-30 NOTE — PLAN OF CARE
Problem: Knowledge Deficit  Goal: Verbalizes understanding of labor plan  Description: Assess patient/family/caregiver's baseline knowledge level and ability to understand information.  Provide education via patient/family/caregiver's preferred learning method at appropriate level of understanding.     1. Provide teaching at level of understanding.  2. Provide teaching via preferred learning method(s).  4/30/2024 1511 by Trey Eastman RN  Outcome: Completed  4/30/2024 0703 by Trey Eastman RN  Outcome: Progressing     Problem: Labor & Delivery  Goal: Manages discomfort  Description: Assess and monitor for signs and symptoms of discomfort.  Assess patient's pain level regularly and per hospital policy.  Administer medications as ordered. Support use of nonpharmacological methods to help control pain such as distraction, imagery, relaxation, and application of heat and cold.  Collaborate with interdisciplinary team and patient to determine appropriate pain management plan.    1. Include patient in decisions related to comfort.  2. Offer non-pharmacological pain management interventions.  3. Report ineffective pain management to physician.  4/30/2024 1511 by Trey Eastman RN  Outcome: Completed  4/30/2024 0703 by Trey Eastman RN  Outcome: Progressing  Goal: Patient vital signs are stable  Description: 1. Assess vital signs - vaginal delivery.  4/30/2024 1511 by Trey Eastman RN  Outcome: Completed  4/30/2024 0703 by Trey Eastman RN  Outcome: Progressing

## 2024-05-01 VITALS
OXYGEN SATURATION: 98 % | SYSTOLIC BLOOD PRESSURE: 131 MMHG | RESPIRATION RATE: 16 BRPM | DIASTOLIC BLOOD PRESSURE: 68 MMHG | HEIGHT: 65 IN | WEIGHT: 191 LBS | TEMPERATURE: 97.9 F | HEART RATE: 82 BPM | BODY MASS INDEX: 31.82 KG/M2

## 2024-05-01 PROBLEM — O99.820 GROUP B STREPTOCOCCAL CARRIAGE COMPLICATING PREGNANCY: Status: RESOLVED | Noted: 2023-10-12 | Resolved: 2024-05-01

## 2024-05-01 PROBLEM — O09.899 PRIOR PREGNANCY COMPLICATED BY PIH, ANTEPARTUM: Status: RESOLVED | Noted: 2023-10-05 | Resolved: 2024-05-01

## 2024-05-01 PROBLEM — O99.213 OBESITY AFFECTING PREGNANCY IN THIRD TRIMESTER: Status: RESOLVED | Noted: 2023-10-05 | Resolved: 2024-05-01

## 2024-05-01 PROBLEM — Z34.90 ENCOUNTER FOR ELECTIVE INDUCTION OF LABOR: Status: RESOLVED | Noted: 2024-04-29 | Resolved: 2024-05-01

## 2024-05-01 PROBLEM — O09.523 AMA (ADVANCED MATERNAL AGE) MULTIGRAVIDA 35+, THIRD TRIMESTER: Status: RESOLVED | Noted: 2023-10-05 | Resolved: 2024-05-01

## 2024-05-01 LAB
ERYTHROCYTE [DISTWIDTH] IN BLOOD BY AUTOMATED COUNT: 14.6 % (ref 11.6–15.1)
HCT VFR BLD AUTO: 33.1 % (ref 34.8–46.1)
HGB BLD-MCNC: 10.7 G/DL (ref 11.5–15.4)
MCH RBC QN AUTO: 27.9 PG (ref 26.8–34.3)
MCHC RBC AUTO-ENTMCNC: 32.3 G/DL (ref 31.4–37.4)
MCV RBC AUTO: 86 FL (ref 82–98)
PLATELET # BLD AUTO: 160 THOUSANDS/UL (ref 149–390)
PMV BLD AUTO: 11 FL (ref 8.9–12.7)
RBC # BLD AUTO: 3.83 MILLION/UL (ref 3.81–5.12)
WBC # BLD AUTO: 12.52 THOUSAND/UL (ref 4.31–10.16)

## 2024-05-01 PROCEDURE — 85027 COMPLETE CBC AUTOMATED: CPT | Performed by: OBSTETRICS & GYNECOLOGY

## 2024-05-01 PROCEDURE — 99024 POSTOP FOLLOW-UP VISIT: CPT | Performed by: OBSTETRICS & GYNECOLOGY

## 2024-05-01 PROCEDURE — NC001 PR NO CHARGE: Performed by: OBSTETRICS & GYNECOLOGY

## 2024-05-01 RX ORDER — DOCUSATE SODIUM 100 MG/1
100 CAPSULE, LIQUID FILLED ORAL 2 TIMES DAILY PRN
Qty: 30 CAPSULE | Refills: 0 | Status: SHIPPED | OUTPATIENT
Start: 2024-05-01 | End: 2024-05-06

## 2024-05-01 RX ORDER — IBUPROFEN 600 MG/1
600 TABLET ORAL EVERY 6 HOURS PRN
Qty: 30 TABLET | Refills: 0 | Status: SHIPPED | OUTPATIENT
Start: 2024-05-01 | End: 2024-05-06

## 2024-05-01 RX ADMIN — DOCUSATE SODIUM 100 MG: 100 CAPSULE, LIQUID FILLED ORAL at 08:11

## 2024-05-01 RX ADMIN — ACETAMINOPHEN 650 MG: 325 TABLET, FILM COATED ORAL at 03:15

## 2024-05-01 RX ADMIN — IBUPROFEN 600 MG: 600 TABLET, FILM COATED ORAL at 11:33

## 2024-05-01 RX ADMIN — ACETAMINOPHEN 650 MG: 325 TABLET, FILM COATED ORAL at 16:25

## 2024-05-01 RX ADMIN — SERTRALINE 100 MG: 100 TABLET, FILM COATED ORAL at 08:12

## 2024-05-01 RX ADMIN — IBUPROFEN 600 MG: 600 TABLET, FILM COATED ORAL at 06:10

## 2024-05-01 NOTE — DISCHARGE SUMMARY
Discharge Summary  Michelle Marion 34 y.o. female MRN: [id number]  Unit/Bed#: -01 Encounter: [id number]    Admission Date: 2024  Discharge Date: 24  Attending: Natalie Reyna MD    Admission Diagnosis:    (1) 34 y.o.  with pregnancy at 40w3d    (2) Gestational hypertension    Discharge Diagnosis:    (1) 34 y.o.  status-post Vaginal, Spontaneous  on 2024     (2) 3740 g (8 lb 3.9 oz)  male  , apgars 8  / 9     Procedures:    Delivery (Vaginal, Spontaneous ) 2024  1:03 PM     Hospital Course:    Michelle Marion is a 34 y.o. now  admitted 2024 at 40w4d with TN..    She was admitted and induction was started with Cool Balloon.  She progressed along her labor curve and delivered a viable 3740 g (8 lb 3.9 oz)  male   on 2024  via Vaginal, Spontaneous     She underwent normal post delivery care and recovered well.    On day of discharge 24, she is ambulating, voiding on her own, passing flatus, and tolerating oral intake.  She has appropriate lochia and mild cramps.  She has mild pain that is well controlled with only oral analgesics.    Her postpartum blood pressures are mostly normal, and she has no symptoms of severe preeclampsia.  She will do home BP monitoring and follow up in one week for BP check.    She will follow up in 3 weeks for routine postpartum visit..    Complications:    None    Condition at discharge:    good    Discharge Medications:    For a complete list of the patient's medications, please refer to her medical reconcillation report.    Discharge instructions/Information to patient and family:    See after visit summary for information provided to patient and family.      Provisions for Follow-Up Care:    See after visit summary for information related to follow-up care and any pertinent home health orders.      Disposition:    See After Visit Summary for discharge disposition information.    Planned  Readmission:    No    Marcos Reid MD  05/01/24

## 2024-05-01 NOTE — DISCHARGE INSTR - AVS FIRST PAGE
--- Please check your blood pressure at home twice a day        Please call if your blood pressure is > 160 (top number) or > 110 (bottom number)        Please call if you have a headache that is not relieved by Tylenol        Please call if you have visual changes like those we discussed.        Please call if you have persistent pain on your right side near your ribcage

## 2024-05-01 NOTE — LACTATION NOTE
This note was copied from a baby's chart.  Met with parents to discuss feeding plan and discuss the Breastfeeding Discharge Booklet with plans for an early discharge.    Baby is currently at a 1.4% weight loss, having appropriate output and 24 hour bilirubin will be checked soon.     Discussed the importance of ensuring that baby feeds 8-12x in 24 hours and that baby has 6 wet diapers or more that are becoming more dilute as well as soiled diapers that are transitioning demonstrated by color change from meconium to a yellow/gold seedy loose stool by day 5.    Mother was given resources to look up medications to ensure they are safe with breastfeeding, by communicating with the [facility], [facility], [facility] as well as using [email or web address]-lact[facility].    Mother is aware of engorgement time frame (when mature milk comes in) and management as well as how to deal with conditions that may occur while breastfeeding (plugged ducts, milk blebs and mastitis) and when is appropriate to communicate with her OB/GYN and/or a lactation consultant.    Mother is comfortable with how to set up a pump, how to cycle (stimulation vs expression phases during a pumping session), importance of flange fit and trying different sizes to ensure best fit, milk storage and how to properly clean parts. Discussed handouts for tips on pumping when returning to work and paced bottle feeding.    Addressed breast pump needs and mother discussed that she has a double breast pump for home use.    Discussed community resources for continued support in breastfeeding once discharged home.     Parents were encouraged to call for a latch assessment and/or further questions that arise prior to discharge.

## 2024-05-01 NOTE — NURSING NOTE
RN reviewed discharge paperwork with MOB and FOB. Patient verbalized understanding of education and asked appropriate questions. All questions acknowledged and answered.

## 2024-05-01 NOTE — PROGRESS NOTES
"Ob Postpartum Note  Michelle Marion 34 y.o. female MRN: [id number]  Unit/Bed#: - Encounter: [id number]    A/P.  34 y.o.  PPD#1 s/p delivery (Vaginal, Spontaneous ) at 40w4d of 3740 g (8 lb 3.9 oz)  male  , apgars 8 /9 .  (1) PPD#1.  Delivered 2024  1:03 PM .  --> Routine postpartum care.  --> Discharge in PM if she continues well.    (2) GHTN.  BP mostly normal since delivery.  No symptoms of severe preeclampsia.  Labs without evidence end-organ damage, P/C ratio 0.11.    Discussed with her.  --> Home BP monitoring.  --> Follow up one week BP check.    Marcos Reid MD  24  ---------------------------------------------------------------------------------------------    Subjective/    Feels well.  Tolerating po, ambulating, voiding without difficulty.  Happy.  Breastfeeding/bonding.  Mild cramps, appropriate lochia.    No HA/VF changes.    Objective/  Blood pressure 127/75, pulse 80, temperature 97.6 °F (36.4 °C), temperature source Oral, resp. rate 18, height 5' 5\" (1.651 m), weight 86.6 kg (191 lb), last menstrual period 2023, SpO2 97%, currently breastfeeding.    Patient Vitals for the past 24 hrs:   BP Temp Temp src Pulse Resp SpO2   24 0833 127/75 97.6 °F (36.4 °C) Oral 80 18 97 %   24 0315 122/76 (!) 97.1 °F (36.2 °C) Temporal 72 -- 98 %   24 2259 116/64 (!) 97.4 °F (36.3 °C) Temporal 77 18 99 %   04/30/24 1954 134/69 98.2 °F (36.8 °C) Oral 90 18 99 %   24 1724 134/78 98 °F (36.7 °C) Oral 91 18 99 %   24 1625 145/82 -- -- 95 -- --   24 1620 133/75 -- -- 96 -- --   24 1616 125/69 -- -- 102 -- --   24 1610 136/76 -- -- 93 -- --   24 1605 139/78 -- -- 91 -- --   24 1600 152/81 -- -- 94 -- --   24 1549 (!) 175/83 -- -- (!) 107 -- --   24 1534 129/77 -- -- 88 -- --   24 1519 126/79 -- -- 97 -- --   24 1504 124/72 -- -- 83 -- --   24 1449 132/77 -- -- 77 -- --   24 1445 " 129/74 -- -- -- -- --   04/30/24 1434 -- -- -- 76 -- --   04/30/24 1419 125/71 -- -- 80 -- --   04/30/24 1404 115/72 -- -- -- -- --   04/30/24 1348 137/64 -- -- 85 -- --   04/30/24 1330 138/97 -- -- 84 -- --   04/30/24 1318 139/78 -- -- 83 -- --   04/30/24 1243 141/67 -- -- 98 -- --   04/30/24 1229 100/57 -- -- 69 -- --   04/30/24 1212 118/69 -- -- 73 -- --   04/30/24 1158 107/61 -- -- 71 -- --   04/30/24 1142 101/56 -- -- 88 -- --   04/30/24 1129 105/55 -- -- 74 -- --   04/30/24 1112 102/59 -- -- 76 -- --   04/30/24 1100 98/56 -- -- 75 -- --   04/30/24 1050 119/69 -- -- -- -- --   04/30/24 1043 -- -- -- 82 -- --   04/30/24 1030 116/68 -- -- -- -- --   04/30/24 1027 -- -- -- 81 -- --   04/30/24 1014 116/61 -- -- 85 -- --   04/30/24 0959 136/64 -- -- 85 -- --   04/30/24 0942 129/69 -- -- 85 -- --   04/30/24 0939 129/69 -- -- 79 -- --   04/30/24 0936 130/68 -- -- 80 -- --   04/30/24 0933 127/68 -- -- 83 -- --   04/30/24 0930 125/67 97.9 °F (36.6 °C) Oral 93 -- 98 %   04/30/24 0929 -- -- -- 96 -- --   04/30/24 0927 129/76 -- -- 88 -- --   04/30/24 0925 -- -- -- 105 -- 99 %   04/30/24 0924 128/76 -- -- 88 -- --       Intake/Output Summary (Last 24 hours) at 5/1/2024 0922  Last data filed at 4/30/2024 1922  Gross per 24 hour   Intake --   Output 918 ml   Net -918 ml         Physical Exam/      General:  Alert, comfortable, NAD      Cardiovascular: Regular rate and rhythm      Respiratory: Clear to auscultation bilaterally.      Abdomen: Soft, non-tender, non-distended      Fundus: Firm, below umbilicus, nontender      Extremities: Warm, non-tender      Labs/      Blood type:  A+/-       Rubella: Immune      Lab Results   Component Value Date    WBC 12.52 (H) 05/01/2024    HGB 10.7 (L) 05/01/2024    HCT 33.1 (L) 05/01/2024    MCV 86 05/01/2024     05/01/2024

## 2024-05-01 NOTE — PLAN OF CARE
Problem: PAIN - ADULT  Goal: Verbalizes/displays adequate comfort level or baseline comfort level  Description: Interventions:  - Encourage patient to monitor pain and request assistance  - Assess pain using appropriate pain scale  - Administer analgesics based on type and severity of pain and evaluate response  - Implement non-pharmacological measures as appropriate and evaluate response  - Consider cultural and social influences on pain and pain management  - Notify physician/advanced practitioner if interventions unsuccessful or patient reports new pain  Outcome: Progressing     Problem: INFECTION - ADULT  Goal: Absence or prevention of progression during hospitalization  Description: INTERVENTIONS:  - Assess and monitor for signs and symptoms of infection  - Monitor lab/diagnostic results  - Monitor all insertion sites, i.e. indwelling lines, tubes, and drains  - Monitor endotracheal if appropriate and nasal secretions for changes in amount and color  - [place]odeo appropriate cooling/warming therapies per order  - Administer medications as ordered  - Instruct and encourage patient and family to use good hand hygiene technique  - Identify and instruct in appropriate isolation precautions for identified infection/condition  Outcome: Progressing  Goal: Absence of fever/infection during neutropenic period  Description: INTERVENTIONS:  - Monitor WBC    Outcome: Progressing     Problem: SAFETY ADULT  Goal: Patient will remain free of falls  Description: INTERVENTIONS:  - Educate patient/family on patient safety including physical limitations  - Instruct patient to call for assistance with activity   - Consult OT/PT to assist with strengthening/mobility   - Keep Call bell within reach  - Keep bed low and locked with side rails adjusted as appropriate  - Keep care items and personal belongings within reach  - Initiate and maintain comfort rounds  - Make Fall Risk Sign visible to staff  - Apply yellow socks and bracelet  for high fall risk patients  - Consider moving patient to room near nurses station  Outcome: Progressing  Goal: Maintain or return to baseline ADL function  Description: INTERVENTIONS:  -  Assess patient's ability to carry out ADLs; assess patient's baseline for ADL function and identify physical deficits which impact ability to perform ADLs (bathing, care of mouth/teeth, toileting, grooming, dressing, etc.)  - Assess/evaluate cause of self-care deficits   - Assess range of motion  - Assess patient's mobility; develop plan if impaired  - Assess patient's need for assistive devices and provide as appropriate  - Encourage maximum independence but intervene and supervise when necessary  - Involve family in performance of ADLs  - Assess for home care needs following discharge   - Consider OT consult to assist with ADL evaluation and planning for discharge  - Provide patient education as appropriate  Outcome: Progressing  Goal: Maintains/Returns to pre admission functional level  Description: INTERVENTIONS:  - Perform AM-PAC 6 Click Basic Mobility/ Daily Activity assessment daily.  - Set and communicate daily mobility goal to care team and patient/family/caregiver.   - Collaborate with rehabilitation services on mobility goals if consulted  - Out of bed for toileting  - Record patient progress and toleration of activity level   Outcome: Progressing     Problem: Knowledge Deficit  Goal: Patient/family/caregiver demonstrates understanding of disease process, treatment plan, medications, and discharge instructions  Description: Complete learning assessment and assess knowledge base.  Interventions:  - Provide teaching at level of understanding  - Provide teaching via preferred learning methods  Outcome: Progressing     Problem: DISCHARGE PLANNING  Goal: Discharge to home or other facility with appropriate resources  Description: INTERVENTIONS:  - Identify barriers to discharge w/patient and caregiver  - Arrange for needed  discharge resources and transportation as appropriate  - Identify discharge learning needs (meds, wound care, etc.)  - Arrange for interpretive services to assist at discharge as needed  - Refer to Case Management Department for coordinating discharge planning if the patient needs post-hospital services based on physician/advanced practitioner order or complex needs related to functional status, cognitive ability, or social support system  Outcome: Progressing     Problem: POSTPARTUM  Goal: Experiences normal postpartum course  Description: INTERVENTIONS:  - Monitor maternal vital signs  - Assess uterine involution and lochia  Outcome: Progressing  Goal: Appropriate maternal -  bonding  Description: INTERVENTIONS:  - Identify family support  - Assess for appropriate maternal/infant bonding   -Encourage maternal/infant bonding opportunities  - Referral to  or  as needed  Outcome: Progressing  Goal: Establishment of infant feeding pattern  Description: INTERVENTIONS:  - Assess breast/bottle feeding  - Refer to lactation as needed  Outcome: Progressing  Goal: Incision(s), wounds(s) or drain site(s) healing without S/S of infection  Description: INTERVENTIONS  - Assess and document dressing, incision, wound bed, drain sites and surrounding tissue  - Provide patient and family education  Outcome: Progressing

## 2024-05-03 ENCOUNTER — TELEPHONE (OUTPATIENT)
Dept: OBGYN CLINIC | Facility: CLINIC | Age: 34
End: 2024-05-03

## 2024-05-03 DIAGNOSIS — O98.313 GENITAL HERPES AFFECTING PREGNANCY IN THIRD TRIMESTER: ICD-10-CM

## 2024-05-03 DIAGNOSIS — O99.343 ANXIETY DURING PREGNANCY IN THIRD TRIMESTER, ANTEPARTUM: Primary | ICD-10-CM

## 2024-05-03 DIAGNOSIS — F41.9 ANXIETY DURING PREGNANCY IN THIRD TRIMESTER, ANTEPARTUM: Primary | ICD-10-CM

## 2024-05-03 DIAGNOSIS — O99.013 ANEMIA AFFECTING PREGNANCY IN THIRD TRIMESTER: ICD-10-CM

## 2024-05-03 DIAGNOSIS — A60.09 GENITAL HERPES AFFECTING PREGNANCY IN THIRD TRIMESTER: ICD-10-CM

## 2024-05-03 PROCEDURE — 88307 TISSUE EXAM BY PATHOLOGIST: CPT | Performed by: PATHOLOGY

## 2024-05-03 NOTE — TELEPHONE ENCOUNTER
"POSTPARTUM PHONE CALL ASSESSMENT    Date of Delivery: 4/29/24  Delivering Provider: Maribell   Mode: Elective IOL (vaginal)   Delivery Notes/Complications:    Do you still have bleeding/pain? If so, how much/how severe? Light bleeding, changing pad 3 times day, not soaking a pad. Mild cramping with breast feeding, denies having any pain.    Regular BMs/Urination? Yes   Breastfeeding/Formula/Both? Both  How are you doing emotionally? \"Feeling good, just tired\"   EPDS Score: 2  Do you have any other questions or concerns for us or your provider? No concerns at this time, states, everything has been good.   Have you scheduled the pediatrician appointment with pediatrician? Seen today 5/3/24   Do you have a postpartum visit scheduled? Yes, BP check    Date scheduled: 5/6/24   Will schedule 3 wk PP check at that time       "

## 2024-05-06 ENCOUNTER — POSTPARTUM VISIT (OUTPATIENT)
Dept: OBGYN CLINIC | Facility: CLINIC | Age: 34
End: 2024-05-06

## 2024-05-06 ENCOUNTER — TELEPHONE (OUTPATIENT)
Dept: LABOR AND DELIVERY | Facility: HOSPITAL | Age: 34
End: 2024-05-06

## 2024-05-06 ENCOUNTER — NURSE TRIAGE (OUTPATIENT)
Age: 34
End: 2024-05-06

## 2024-05-06 ENCOUNTER — HOSPITAL ENCOUNTER (OUTPATIENT)
Facility: HOSPITAL | Age: 34
Discharge: HOME | End: 2024-05-06
Attending: STUDENT IN AN ORGANIZED HEALTH CARE EDUCATION/TRAINING PROGRAM | Admitting: STUDENT IN AN ORGANIZED HEALTH CARE EDUCATION/TRAINING PROGRAM
Payer: COMMERCIAL

## 2024-05-06 VITALS
HEART RATE: 101 BPM | TEMPERATURE: 98 F | RESPIRATION RATE: 17 BRPM | SYSTOLIC BLOOD PRESSURE: 139 MMHG | DIASTOLIC BLOOD PRESSURE: 80 MMHG

## 2024-05-06 VITALS
SYSTOLIC BLOOD PRESSURE: 154 MMHG | HEIGHT: 65 IN | DIASTOLIC BLOOD PRESSURE: 96 MMHG | WEIGHT: 177.2 LBS | BODY MASS INDEX: 29.52 KG/M2

## 2024-05-06 DIAGNOSIS — Z87.59 HISTORY OF GESTATIONAL HYPERTENSION: Primary | ICD-10-CM

## 2024-05-06 LAB
ALBUMIN SERPL BCP-MCNC: 3.7 G/DL (ref 3.5–5)
ALP SERPL-CCNC: 86 U/L (ref 34–104)
ALT SERPL W P-5'-P-CCNC: 12 U/L (ref 7–52)
ANION GAP SERPL CALCULATED.3IONS-SCNC: 10 MMOL/L (ref 4–13)
AST SERPL W P-5'-P-CCNC: 17 U/L (ref 13–39)
BILIRUB SERPL-MCNC: 0.36 MG/DL (ref 0.2–1)
BUN SERPL-MCNC: 9 MG/DL (ref 5–25)
CALCIUM SERPL-MCNC: 9.1 MG/DL (ref 8.4–10.2)
CHLORIDE SERPL-SCNC: 106 MMOL/L (ref 96–108)
CO2 SERPL-SCNC: 25 MMOL/L (ref 21–32)
CREAT SERPL-MCNC: 0.53 MG/DL (ref 0.6–1.3)
ERYTHROCYTE [DISTWIDTH] IN BLOOD BY AUTOMATED COUNT: 13.9 % (ref 11.6–15.1)
GFR SERPL CREATININE-BSD FRML MDRD: 122 ML/MIN/1.73SQ M
GLUCOSE SERPL-MCNC: 98 MG/DL (ref 65–140)
HCT VFR BLD AUTO: 40.9 % (ref 34.8–46.1)
HGB BLD-MCNC: 12.9 G/DL (ref 11.5–15.4)
MCH RBC QN AUTO: 27.2 PG (ref 26.8–34.3)
MCHC RBC AUTO-ENTMCNC: 31.5 G/DL (ref 31.4–37.4)
MCV RBC AUTO: 86 FL (ref 82–98)
PLATELET # BLD AUTO: 262 THOUSANDS/UL (ref 149–390)
PMV BLD AUTO: 9.8 FL (ref 8.9–12.7)
POTASSIUM SERPL-SCNC: 3.6 MMOL/L (ref 3.5–5.3)
PROT SERPL-MCNC: 7 G/DL (ref 6.4–8.4)
RBC # BLD AUTO: 4.75 MILLION/UL (ref 3.81–5.12)
SODIUM SERPL-SCNC: 141 MMOL/L (ref 135–147)
WBC # BLD AUTO: 8.63 THOUSAND/UL (ref 4.31–10.16)

## 2024-05-06 PROCEDURE — 99024 POSTOP FOLLOW-UP VISIT: CPT | Performed by: OBSTETRICS & GYNECOLOGY

## 2024-05-06 PROCEDURE — 99214 OFFICE O/P EST MOD 30 MIN: CPT | Performed by: STUDENT IN AN ORGANIZED HEALTH CARE EDUCATION/TRAINING PROGRAM

## 2024-05-06 PROCEDURE — 80053 COMPREHEN METABOLIC PANEL: CPT | Performed by: STUDENT IN AN ORGANIZED HEALTH CARE EDUCATION/TRAINING PROGRAM

## 2024-05-06 PROCEDURE — 99213 OFFICE O/P EST LOW 20 MIN: CPT

## 2024-05-06 PROCEDURE — 85027 COMPLETE CBC AUTOMATED: CPT | Performed by: STUDENT IN AN ORGANIZED HEALTH CARE EDUCATION/TRAINING PROGRAM

## 2024-05-06 RX ORDER — NIFEDIPINE 30 MG/1
30 TABLET, EXTENDED RELEASE ORAL DAILY
Qty: 60 TABLET | Refills: 0 | Status: SHIPPED | OUTPATIENT
Start: 2024-05-07 | End: 2024-07-06

## 2024-05-06 RX ORDER — NIFEDIPINE 30 MG/1
30 TABLET, EXTENDED RELEASE ORAL DAILY
Status: DISCONTINUED | OUTPATIENT
Start: 2024-05-06 | End: 2024-05-06 | Stop reason: HOSPADM

## 2024-05-06 RX ADMIN — NIFEDIPINE 30 MG: 30 TABLET, EXTENDED RELEASE ORAL at 13:21

## 2024-05-06 NOTE — TELEPHONE ENCOUNTER
"Patient called,  on 24.  BP this AM was 145/100, rechecked at 0945 140/95.  HA this AM, but drank water and it resolved. Right hand circulation concern, cold to touch full, ROM,  denies discoloration. Improves with movement. Denies pain with movement.  Hx of GHTN, postpartum blood pressures are mostly normal, and she has no symptoms of severe preeclampsia. Patient is checking  BP BID and has BP visit today w/ nurse. Bps have been normal except for today.      Tiger Text to Dr. Perera on call - Okay to go to office visit since her headache has resolved. As long as her circulation concern improves with movement, we can have a physician examine her arm while there to see if she needs ER evaluation for her extremity.    Updated appointment note and sent to clinical team to make aware.       Reason for Disposition  • Patient wants to be seen    Answer Assessment - Initial Assessment Questions  1. BLOOD PRESSURE: \"What is the blood pressure?\" \"Did you take at least two measurements 5 minutes apart?\"      BP this AM was 145/100, rechecked at 0945 140/95.   2. ONSET: \"When did you take your blood pressure?\"      Taking BP BID   3. HOW: \"How did you obtain the blood pressure?\" (e.g., visiting nurse, automatic home BP monitor)      Automatic cuff  4. HISTORY: \"Do you have a history of high blood pressure?\"      Post partum   5. MEDICATIONS: \"Are you taking any medications for blood pressure?\" \"Have you missed any doses recently?\"      Denies  6. OTHER SYMPTOMS: \"Do you have any symptoms?\" (e.g., headache, chest pain, blurred vision, difficulty breathing, weakness)      HA this AM, but drank water and it resolved. Right hand circulation concern, cold to touch full, ROM,  denies discoloration. Improves with movement. Denies pain with movement.    Protocols used: Blood Pressure - High-ADULT-OH    "

## 2024-05-06 NOTE — TELEPHONE ENCOUNTER
Patient was seen in triage today for postpartum blood pressure evaluation in the setting of known gestational hypertension. She was started on Procardia XL 30 mg PO daily. She is without signs or symptoms of pre-eclampsia. She should be seen in office for nursing visit for repeat BP check tomorrow, 5/7.    Gera Perera MD  5/6/2024 1:42 PM

## 2024-05-06 NOTE — ASSESSMENT & PLAN NOTE
"- Blood pressures are stable in the \"mild\" range in triage. She is without signs/symptoms of severe features.   - Given up-trend in blood pressures, will start Procardia XL 30 mg PO daily now. Given no severely elevated blood pressures and normal labs, outpatient management is appropriate. Patient will follow up in office tomorrow for repeat blood pressure to determine if further medication titration is necessary.   - Signs/symptoms of pre-eclampsia reviewed. Patient will call if headache unrelieved by OTC acetaminophen, changes in vision, chest pain, SOB, RUQ pain, or peripheral swelling.   "

## 2024-05-06 NOTE — PROGRESS NOTES
Assessment/Plan:      Diagnoses and all orders for this visit:    History of gestational hypertension     -  Blood pressure in office Miled to severe range with patient being asymptomatic    -  Sent to L&D for further evalutation    Postpartum state          Subjective:     Patient ID: Michelle Marion is a 34 y.o. female.    Patient PPD #6 s/p  on 24 presents with concern about having elevated blood pressures at home today.  Patient states blood pressures were in 120/70 range but this morning they were 140-145/ range.  Denies havingany HA, change in vision, RUQ pain or N/V.  Hand tingling noted this morning has resolved        Review of Systems   Constitutional:  Negative for activity change, chills, fever and unexpected weight change.   Respiratory:  Negative for shortness of breath.    Genitourinary:  Negative for pelvic pain.   Neurological:  Negative for dizziness, tremors, seizures, speech difficulty, weakness, light-headedness and numbness.   Psychiatric/Behavioral:  Negative for confusion.          Objective:     Physical Exam  Cardiovascular:      Rate and Rhythm: Normal rate.   Musculoskeletal:         General: No swelling or tenderness.      Right lower leg: No edema.      Left lower leg: No edema.

## 2024-05-06 NOTE — H&P
"Triage Note - OB  Michelle Marion 34 y.o. female MRN: [id number]  Unit/Bed#: LD TRIAGE 1- Encounter: [id number]    A/P:  on postpartum day #6 presenting for blood pressure evaluation in the setting of previously diagnosed gestational hypertension.     Gestational hypertension without significant proteinuria, postpartum  - Blood pressures are stable in the \"mild\" range in triage. She is without signs/symptoms of severe features.   - Given up-trend in blood pressures, will start Procardia XL 30 mg PO daily now. Given no severely elevated blood pressures and normal labs, outpatient management is appropriate. Patient will follow up in office tomorrow for repeat blood pressure to determine if further medication titration is necessary.   - Signs/symptoms of pre-eclampsia reviewed. Patient will call if headache unrelieved by OTC acetaminophen, changes in vision, chest pain, SOB, RUQ pain, or peripheral swelling.     _____________________________    Chief Complaint: Sent from office for evaluation.     HPI: Patient is a  on PPD#6 s/p normal spontaneous vaginal delivery who presents for evaluation of elevated blood pressures in office at routine blood pressure check visit in the setting of known gestational hypertension. She reports that prior to today, her blood pressures have been in the 120s/70s at home. This morning, BP was 145/100. At office visit today, blood pressures were 154/69 and 162/98. She denies headache, changes in vision, chest pain, shortness of breath, right upper quadrant pain, or increase in peripheral swelling. She does report transient sensation of cold hands and decreased perfusion of both hands this morning, but this has resolved.      Problems (from 10/05/23 to present)       Problem Noted Resolved    Anemia affecting pregnancy in third trimester 2024 by Kathrine ePrez MD No    Overview Signed 2024  7:00 PM by Kathrine Perez MD     2024 - hgb 10.5g/dl - start po " iron         Anxiety during pregnancy in third trimester, antepartum 10/5/2023 by Alyssa Del Castillo DO No    Overview Signed 10/5/2023  9:00 AM by Alyssa Del Castillo DO     Zoloft 100 mg daily, managed by PCP         Genital herpes affecting pregnancy 10/5/2023 by Alyssa Del Castillo DO No    Overview Signed 10/5/2023  9:04 AM by Alyssa Del Castillo DO     Last outbreak was 6 years ago  Valtrex starting at 36 weeks         39 weeks gestation of pregnancy 11/7/2023 by Ashley Jessica PA-C 4/29/2024 by Alyssa Del Castillo DO    Overview Signed 2/14/2024  1:54 PM by Kathrine Perez MD     Flu vaccine 1/2/204  Adacel - 2/14/2024         Group B streptococcal bacteriuria complicating pregnancy 10/12/2023 by Alyssa Del Castillo, DO 5/1/2024 by Marcos Reid MD    Overview Signed 10/12/2023 11:43 AM by Alyssa Del Castillo DO     Treat in labor         Obesity affecting pregnancy in third trimester 10/5/2023 by Alyssa Del Castillo, DO 5/1/2024 by Marcos Reid MD    Overview Signed 10/5/2023  8:57 AM by Alyssa Del Castillo DO     Early 1 Hr GTT   mg 12-36 weeks         Prior pregnancy complicated by PIH, antepartum 10/5/2023 by Alyssa Del Castillo, DO 5/1/2024 by Marcos Reid MD    Overview Signed 10/5/2023  8:59 AM by Alyssa Del Castillo DO     Baseline PreE labs   mg 12-36 wks         AMA (advanced maternal age) multigravida 35+, third trimester 10/5/2023 by Alyssa Del Castillo, DO 5/1/2024 by Marcos Reid MD            Vitals:   Vitals:    05/06/24 1229 05/06/24 1243 05/06/24 1304   BP: 148/78 150/89 145/85   Pulse: 85 82 77   Resp: 17     Temp: 98 °F (36.7 °C)     TempSrc: Oral         Physical Exam  GEN: Well-appearing, NAD  CVS: RRR, no murmurs, rubs, or gallops  Lungs: CTA throughout. No crackles, wheezes, rhonchi, or rales.   ABD: Soft, non-tender, non-distended. No fundal tenderness. No RUQ tenderness.   Extremities: 1+ bilateral LE edema.   Neuro: AAO x 3, 2+ symmetric DTRs.    Labs:   Recent Results (from the past 24 hour(s))    CBC and Platelet    Collection Time: 05/06/24  1:01 PM   Result Value Ref Range    WBC 8.63 4.31 - 10.16 Thousand/uL    RBC 4.75 3.81 - 5.12 Million/uL    Hemoglobin 12.9 11.5 - 15.4 g/dL    Hematocrit 40.9 34.8 - 46.1 %    MCV 86 82 - 98 fL    MCH 27.2 26.8 - 34.3 pg    MCHC 31.5 31.4 - 37.4 g/dL    RDW 13.9 11.6 - 15.1 %    Platelets 262 149 - 390 Thousands/uL    MPV 9.8 8.9 - 12.7 fL   Comprehensive metabolic panel    Collection Time: 05/06/24  1:01 PM   Result Value Ref Range    Sodium 141 135 - 147 mmol/L    Potassium 3.6 3.5 - 5.3 mmol/L    Chloride 106 96 - 108 mmol/L    CO2 25 21 - 32 mmol/L    ANION GAP 10 4 - 13 mmol/L    BUN 9 5 - 25 mg/dL    Creatinine 0.53 (L) 0.60 - 1.30 mg/dL    Glucose 98 65 - 140 mg/dL    Calcium 9.1 8.4 - 10.2 mg/dL    AST 17 13 - 39 U/L    ALT 12 7 - 52 U/L    Alkaline Phosphatase 86 34 - 104 U/L    Total Protein 7.0 6.4 - 8.4 g/dL    Albumin 3.7 3.5 - 5.0 g/dL    Total Bilirubin 0.36 0.20 - 1.00 mg/dL    eGFR 122 ml/min/1.73sq m       Lab, Imaging and other studies: I have personally reviewed pertinent reports.    Gera Perera MD  5/6/2024 1:40 PM

## 2024-05-07 ENCOUNTER — NURSE TRIAGE (OUTPATIENT)
Age: 34
End: 2024-05-07

## 2024-05-07 ENCOUNTER — HOSPITAL ENCOUNTER (OUTPATIENT)
Facility: HOSPITAL | Age: 34
Discharge: HOME | End: 2024-05-07
Attending: OBSTETRICS & GYNECOLOGY | Admitting: OBSTETRICS & GYNECOLOGY
Payer: COMMERCIAL

## 2024-05-07 VITALS
TEMPERATURE: 98.1 F | DIASTOLIC BLOOD PRESSURE: 72 MMHG | OXYGEN SATURATION: 97 % | SYSTOLIC BLOOD PRESSURE: 128 MMHG | HEART RATE: 93 BPM

## 2024-05-07 LAB
ALBUMIN SERPL BCP-MCNC: 3.9 G/DL (ref 3.5–5)
ALP SERPL-CCNC: 91 U/L (ref 34–104)
ALT SERPL W P-5'-P-CCNC: 12 U/L (ref 7–52)
ANION GAP SERPL CALCULATED.3IONS-SCNC: 11 MMOL/L (ref 4–13)
AST SERPL W P-5'-P-CCNC: 16 U/L (ref 13–39)
BILIRUB SERPL-MCNC: 0.33 MG/DL (ref 0.2–1)
BUN SERPL-MCNC: 10 MG/DL (ref 5–25)
CALCIUM SERPL-MCNC: 9.5 MG/DL (ref 8.4–10.2)
CHLORIDE SERPL-SCNC: 106 MMOL/L (ref 96–108)
CO2 SERPL-SCNC: 23 MMOL/L (ref 21–32)
CREAT SERPL-MCNC: 0.48 MG/DL (ref 0.6–1.3)
CREAT UR-MCNC: 16.3 MG/DL
ERYTHROCYTE [DISTWIDTH] IN BLOOD BY AUTOMATED COUNT: 13.8 % (ref 11.6–15.1)
GFR SERPL CREATININE-BSD FRML MDRD: 126 ML/MIN/1.73SQ M
GLUCOSE SERPL-MCNC: 100 MG/DL (ref 65–140)
HCT VFR BLD AUTO: 41.1 % (ref 34.8–46.1)
HGB BLD-MCNC: 13.1 G/DL (ref 11.5–15.4)
MCH RBC QN AUTO: 27.3 PG (ref 26.8–34.3)
MCHC RBC AUTO-ENTMCNC: 31.9 G/DL (ref 31.4–37.4)
MCV RBC AUTO: 86 FL (ref 82–98)
PLATELET # BLD AUTO: 299 THOUSANDS/UL (ref 149–390)
PMV BLD AUTO: 9.5 FL (ref 8.9–12.7)
POTASSIUM SERPL-SCNC: 3.8 MMOL/L (ref 3.5–5.3)
PROT SERPL-MCNC: 7.4 G/DL (ref 6.4–8.4)
PROT UR-MCNC: <4 MG/DL
RBC # BLD AUTO: 4.8 MILLION/UL (ref 3.81–5.12)
SODIUM SERPL-SCNC: 140 MMOL/L (ref 135–147)
WBC # BLD AUTO: 9.83 THOUSAND/UL (ref 4.31–10.16)

## 2024-05-07 PROCEDURE — 82570 ASSAY OF URINE CREATININE: CPT | Performed by: OBSTETRICS & GYNECOLOGY

## 2024-05-07 PROCEDURE — 80053 COMPREHEN METABOLIC PANEL: CPT | Performed by: OBSTETRICS & GYNECOLOGY

## 2024-05-07 PROCEDURE — 99213 OFFICE O/P EST LOW 20 MIN: CPT

## 2024-05-07 PROCEDURE — NC001 PR NO CHARGE: Performed by: OBSTETRICS & GYNECOLOGY

## 2024-05-07 PROCEDURE — 84156 ASSAY OF PROTEIN URINE: CPT | Performed by: OBSTETRICS & GYNECOLOGY

## 2024-05-07 PROCEDURE — 85027 COMPLETE CBC AUTOMATED: CPT | Performed by: OBSTETRICS & GYNECOLOGY

## 2024-05-07 RX ORDER — METOCLOPRAMIDE 10 MG/1
10 TABLET ORAL ONCE
Status: COMPLETED | OUTPATIENT
Start: 2024-05-07 | End: 2024-05-07

## 2024-05-07 RX ORDER — DIPHENHYDRAMINE HCL 25 MG
25 TABLET ORAL ONCE
Status: COMPLETED | OUTPATIENT
Start: 2024-05-07 | End: 2024-05-07

## 2024-05-07 RX ORDER — ACETAMINOPHEN 325 MG/1
975 TABLET ORAL ONCE
Status: DISCONTINUED | OUTPATIENT
Start: 2024-05-07 | End: 2024-05-07

## 2024-05-07 RX ORDER — ACETAMINOPHEN 325 MG/1
650 TABLET ORAL ONCE
Status: DISCONTINUED | OUTPATIENT
Start: 2024-05-07 | End: 2024-05-07 | Stop reason: HOSPADM

## 2024-05-07 RX ADMIN — ACETAMINOPHEN 650 MG: 325 TABLET, FILM COATED ORAL at 12:57

## 2024-05-07 RX ADMIN — DIPHENHYDRAMINE HYDROCHLORIDE 25 MG: 25 TABLET ORAL at 12:58

## 2024-05-07 RX ADMIN — METOCLOPRAMIDE 10 MG: 10 TABLET ORAL at 12:58

## 2024-05-07 NOTE — TELEPHONE ENCOUNTER
Patient called -  24. HX GHTN, seen in triage yesterday for PP BP eval. Started on Procardia XL 30 mg PO daily. Did not have any s/s of preE upon eval yesterday. Per patient her last BP this AM was 130/94, highest this morning 140/100. Patient has a headache, took Tylenol at 0845, 1,000 MG. No relief. Denies visual disturbances, lightheadedness, dizziness. Had patient recheck BP on phone with me at 1135, BP is 139/95. Patient had BP check in office today at 1045 but didn't report to office yet as she wanted to confirm that she should be seen as she has a new onset on a HA.    Tiger Text to Dr. Reyna on call - If her headache has not improved needs to return to triage    Spoke to patient, she will return to Labor and Delivery at . Tiger Text sent to Paty Paz  charge nurse for Labor and Delivery to make aware.

## 2024-05-07 NOTE — QUICK NOTE
Update:    S/    Headache improved, still present.  Now 3/10.    BP:     135/85    133/77    132/78    128/72    Labs/    Hgb 13.1    Platelets 299      BUN/Cr 10/0.48    AST/ALT 16/12      P/C Ratio <4/16.3 = Low.    --> Water, crackers given.  --> Will observe a further 20-30 mins.    Marcos Reid MD

## 2024-05-07 NOTE — H&P
"Ob/Gyn History and Physical  Michelle Marion 34 y.o. female MRN: [id number]  Unit/Bed#: LD TRIAGE  Encounter: [id number]    A/P. 34 y.o.  at 40w4d,  PPD#7 s/p delivery (Vaginal, Spontaneous ) at 40w4d of 3740 g (8 lb 3.9 oz)  male  , apgars 8 /9 .   (1) GHTN versus preeclampsia with severe features.  Labs have been normal as recently as yesterday.  P/C ratio from  was 0.11.    Started Procardia after Triage visit yesterday at which BP was elevated.    BP at home today was normal x 2, then mildly elevated in the context of her headache.      Initial triage BP today is 135/85.  She has a headache, which may be related to preeclampsia, or may be related to starting Procardia.  There are no associated neurologic symptoms.  --> Serial BP.  --> Repeat labs.  --> Tylenol/Reglan/Benadryl.    Marcos Reid MD  24  -------------------------------------------------------------------------------------------------------  CC/    \"I have a headache.\"    HPI/     24 after IoL for GHTN.  Discharged PPD#1 when most of BP were normal.  Had office visit yesterday at which BP was elevated at 154/69 and 162/98.  Sent to triage.  Bps observed here.  Started Procardia XL 30mg daily.    Now with headache this morning.  She took BP and this was normal.  Took Tylenol at 0845, but this did not extinguish the headache.  Headache 5-6/10, dull.  Took BP: 114/70, 118/80, then 146/94.    No VF changes, no RUQ pain.  No other neurologic symptoms.    Pregnancy complications/      Patient Active Problem List   Diagnosis    Anxiety during pregnancy in third trimester, antepartum    Genital herpes affecting pregnancy    Anemia affecting pregnancy in third trimester    Gestational hypertension without significant proteinuria, postpartum    Postpartum state       Allergies/      Allergies as of 2024    (No Known Allergies)       Rx/      Current Facility-Administered Medications on File Prior to Encounter "   Medication Dose Route Frequency Provider Last Rate Last Admin    [DISCONTINUED] NIFEdipine (PROCARDIA XL) 24 hr tablet 30 mg  30 mg Oral Daily Gera Perera MD   30 mg at 24 1321     Current Outpatient Medications on File Prior to Encounter   Medication Sig Dispense Refill    Ascorbic Acid (Vitamin C) 100 MG CHEW Chew      Ferrous Sulfate ER 50 MG TBCR Take by mouth      NIFEdipine (PROCARDIA XL) 30 mg 24 hr tablet Take 1 tablet (30 mg total) by mouth daily 60 tablet 0    Prenatal-FE Bis-FA-DHA w/o A (COMPLETE PRENATAL/DHA PO) Take by mouth Natiure's Bounty with DHA (43 mg) 8 mg EPA      sertraline (ZOLOFT) 100 mg tablet Take 100 mg by mouth daily      witch hazel-glycerin (TUCKS) topical pad Apply 1 Pad topically every 4 (four) hours as needed for irritation 30 each 0       PMH/      Past Medical History:   Diagnosis Date    Anxiety     History of abnormal cervical Pap smear     +HPV    History of ovarian cyst     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        PSH/      Past Surgical History:   Procedure Laterality Date    TONSILECTOMY AND ADNOIDECTOMY      WISDOM TOOTH EXTRACTION         ObHx/          OB History    Para Term  AB Living   3 2 2 0 1 2   SAB IAB Ectopic Multiple Live Births   1 0 0 0 2      # Outcome Date GA Lbr Ashok/2nd Weight Sex Delivery Anes PTL Lv   3 Term 24 40w4d / 00:23 3740 g (8 lb 3.9 oz) M Vag-Spont EPI N INDIRA      Name: Kwesi Marion      Apgar1: 8  Apgar5: 9   2 SAB 2022 9w0d             Birth Comments: MAB-Induced with Misoprostol/Retained POC   1 Term 20 41w0d  3941 g (8 lb 11 oz) M Vag-Spont EPI N INDIRA      Birth Comments: gHTN without significant Proteinuria, First degree laceration     FH/    Family History   Problem Relation Age of Onset    Esophageal cancer Father        SH/    She is  to Jose.    She does not use tobacco, alcohol, or illicit drugs.        Social History     Socioeconomic  History    Marital status: /Civil Union     Spouse name: Not on file    Number of children: Not on file    Years of education: Not on file    Highest education level: Not on file   Occupational History    Not on file   Tobacco Use    Smoking status: Never    Smokeless tobacco: Never   Vaping Use    Vaping status: Never Used   Substance and Sexual Activity    Alcohol use: Not Currently    Drug use: Never    Sexual activity: Yes     Birth control/protection: None   Other Topics Concern    Not on file   Social History Narrative    Not on file     Social Determinants of Health     Financial Resource Strain: Not on file   Food Insecurity: No Food Insecurity (3/26/2024)    Hunger Vital Sign     Worried About Running Out of Food in the Last Year: Never true     Ran Out of Food in the Last Year: Never true   Transportation Needs: No Transportation Needs (3/26/2024)    PRAPARE - Transportation     Lack of Transportation (Medical): No     Lack of Transportation (Non-Medical): No   Physical Activity: Not on file   Stress: Not on file   Social Connections: Not on file   Intimate Partner Violence: Not on file   Housing Stability: Low Risk  (3/26/2024)    Housing Stability Vital Sign     Unable to Pay for Housing in the Last Year: No     Number of Places Lived in the Last Year: 1     Unstable Housing in the Last Year: No       RoS/    Constitutional: Negative    CV: Negative    Pulm: Negative    GI: Negative    Urinary: Negative    Neuro: As in HPI    Musculoskeletal: Negative    O/  Pulse 98   Temp 98.1 °F (36.7 °C)   LMP 07/21/2023 (Exact Date)   SpO2 97%     Alert, comfortable, no acute distress      Neuro:        Alert, appropriate affect, no gross deficits        Normal speech        CN2-12 intact and symmetric        DTR 2+ and symmetric        Muscle strength 5/5 and symmetric    Regular rate and rhythm    Clear to auscultation bilaterally    Abdomen soft, nontender, nondistended.    Fundus firm,  nontender    Prenatal labs/  Lab Results   Component Value Date    ABO A 04/29/2024    RH Positive 04/29/2024    ABS Negative 04/29/2024    HGB 12.9 05/06/2024     05/06/2024    EXTRUBELIGGQ immune 10/09/2023    RPR NON-REACTIVE 02/07/2024    HEPBSAG negative 10/09/2023    HEPCAB NON-REACTIVE 10/09/2023    HIVAGAB NON-REACTIVE 10/09/2023    GC negative 10/05/2023    [id number] 89 02/07/2024

## 2024-05-07 NOTE — QUICK NOTE
Update:    S/    Headache abated.    A/P. 34 y.o.  PPD#7 s/p delivery (Vaginal, Spontaneous ) at 40w4d of 3740 g (8 lb 3.9 oz)  male  , apgars 8 /9, with GHTN.   (1) GHTN.  BP normal here.  Labs normal and P/C ratio low.  Headache essentially extinguished with conservative treatment.  Suspect headache may have been related to new Procardia use.  --> Discharge home with precautions.  --> Continue home BP monitoring.  --> Follow up one week again for office BP check.     Marcos Reid MD  24  Case discussed with on-call physician for Dr. Maribell Ram.

## 2024-05-07 NOTE — TELEPHONE ENCOUNTER
Regarding: PP HIGH BP  ----- Message from An Galvez sent at 5/7/2024 10:30 AM EDT -----  Patient is PP and is having a high bp reading with a slight headache.    Tried to warm transfer.

## 2024-05-15 ENCOUNTER — TELEPHONE (OUTPATIENT)
Dept: OBGYN CLINIC | Facility: CLINIC | Age: 34
End: 2024-05-15

## 2024-05-15 NOTE — TELEPHONE ENCOUNTER
Followed up call placed to patient. Pt was re-evaluated in L&D on 5/7/24 for PP BP elevation. Spoke with patient, she reports her SBP has been running 120's  with a diastolic BP high 70's to 80. Pt denies any headache, swelling, or visual disturbances. Recommendation was to follow-up in office 1 week upon discharge on 5/7/24. Pt informed she forgot to schedule. Pt scheduled to be seen on 5/16/24 for BP check and 5/21/24 for 3 wk PP visit.

## 2024-05-16 ENCOUNTER — CLINICAL SUPPORT (OUTPATIENT)
Dept: OBGYN CLINIC | Facility: CLINIC | Age: 34
End: 2024-05-16

## 2024-05-16 VITALS
DIASTOLIC BLOOD PRESSURE: 86 MMHG | WEIGHT: 174.6 LBS | HEIGHT: 65 IN | BODY MASS INDEX: 29.09 KG/M2 | SYSTOLIC BLOOD PRESSURE: 134 MMHG

## 2024-05-16 DIAGNOSIS — Z01.30 BLOOD PRESSURE CHECK: Primary | ICD-10-CM

## 2024-05-16 PROCEDURE — 99024 POSTOP FOLLOW-UP VISIT: CPT

## 2024-05-16 NOTE — PROGRESS NOTES
Pt presented for BP check, 134/86 been regularly checking at home in the morning and afternoon, taking BP mediation to help,notified , and advised OK to leave , she has another follow up visit with  on 5/21/2024. Checked again at the end of visit , BP still the same.

## 2024-05-23 ENCOUNTER — POSTPARTUM VISIT (OUTPATIENT)
Dept: OBGYN CLINIC | Facility: CLINIC | Age: 34
End: 2024-05-23

## 2024-05-23 VITALS
WEIGHT: 174.8 LBS | SYSTOLIC BLOOD PRESSURE: 126 MMHG | BODY MASS INDEX: 29.12 KG/M2 | HEIGHT: 65 IN | DIASTOLIC BLOOD PRESSURE: 84 MMHG

## 2024-05-23 PROCEDURE — 99024 POSTOP FOLLOW-UP VISIT: CPT | Performed by: OBSTETRICS & GYNECOLOGY

## 2024-05-23 NOTE — PROGRESS NOTES
"[facility] [place]  [address] [place] [address]    Assessment/Plan:  Michelle is a 34 y.o. year old  who presents for postpartum visit.    Routine Postpartum Care  Normal postpartum exam  Contraception: abstinence for now. Considering options  Depression Screen: low risk  Feeding: breast  Psychosocial support: good  Patient Education: \"Fourth Trimester Project: twidox\"  Cervical cancer screening Up to Date, next due   Follow up in: 3 weeks or as needed.    Additional Problems:  1. Routine postpartum follow-up        Subjective:     CC: Postpartum visit    Michelle Marion is a 34 y.o. y.o. female  who presents for a postpartum visit.     She is 3 weeks postpartum following a spontaneous vaginal delivery on 24 at 40.4 weeks.    Outcome: spontaneous vaginal delivery. Anesthesia: epidural. Postpartum course has been unremarkable. Baby's course has been unremarkable. Baby is feeding by breast.     Bleeding staining only. Bowel function is normal. Bladder function is normal. Patient is not sexually active. Contraception method is  TBD . Postpartum depression screening: negative.    The following portions of the patient's history were reviewed and updated as appropriate: allergies, current medications, past family history, past medical history, obstetric history, gynecologic history, past social history, past surgical history and problem list.      Objective:  /84 (BP Location: Left arm, Patient Position: Sitting, Cuff Size: Standard)   Ht 5' 5\" (1.651 m)   Wt 79.3 kg (174 lb 12.8 oz)   LMP 2023 (Exact Date)   Breastfeeding No   BMI 29.09 kg/m²   Pregravid Weight/BMI: 85.3 kg (188 lb) (BMI 31.28)  Current Weight: 79.3 kg (174 lb 12.8 oz)   Total Weight Gain: 1.361 kg (3 lb)   Pre-Dave Vitals      Flowsheet Row Most Recent Value   Prenatal Assessment    Prenatal Vitals    Blood Pressure 126/84   Weight - Scale 79.3 kg (174 lb 12.8 oz)   Urine " Albumin/Glucose    Dilation/Effacement/Station    Vaginal Drainage    Edema              General: Well appearing, no distress.  Mood and affect: Appropriate.  Abdomen: Soft, nontender  Thyroid: No masses  Incision: n/a  Vulva: Well healed  Vagina: Well healed. No lesions  Urethra: Normal  Cervix: Healed, no lesions  Uterus: Normal size, no masses  Adnexa: No pain or masses  Extremities: Warm and well perfused.  Non tender.

## 2024-06-25 ENCOUNTER — POSTPARTUM VISIT (OUTPATIENT)
Dept: OBGYN CLINIC | Facility: CLINIC | Age: 34
End: 2024-06-25

## 2024-06-25 VITALS
SYSTOLIC BLOOD PRESSURE: 126 MMHG | BODY MASS INDEX: 29.59 KG/M2 | WEIGHT: 177.6 LBS | HEIGHT: 65 IN | DIASTOLIC BLOOD PRESSURE: 72 MMHG

## 2024-06-25 PROCEDURE — 99024 POSTOP FOLLOW-UP VISIT: CPT | Performed by: STUDENT IN AN ORGANIZED HEALTH CARE EDUCATION/TRAINING PROGRAM

## 2024-06-25 NOTE — PROGRESS NOTES
"[facility] [place]  [address] [place] [address]    Assessment/Plan:  Michelle is a 34 y.o. year old  who presents for postpartum visit.    Routine Postpartum Care  Normal postpartum exam  Contraception: Mirena as bridge to vasectomy  Depression Screen: Low risk  Feeding: Breast with bottle supplement.   Cervical cancer screening Up to Date, next due   Follow up for IUD insertion, then IUD check with annual exam or as needed.    Additional Problems:  1. Postpartum examination following vaginal delivery  2. Gestational hypertension without significant proteinuria, postpartum  Assessment & Plan:  - BP normal on Procardia. Will stop medication and check BP daily at home for 1 week. Patient will send values to me via HubHuman for review.         Subjective:     CC: Postpartum visit    Michelle Marion is a 34 y.o. y.o. female  who presents for a postpartum visit.     She is 8 weeks postpartum following a vaginal delivery on 2024 at 40 weeks. Postpartum course has been complicated by postpartum gestational hypertension.     Bleeding no bleeding. Bowel function is normal. Bladder function is normal. Patient is not sexually active.     Postpartum Depression: Low Risk  (2024)    [place]  Depression Scale     Last EPDS Total Score: 2     Last EPDS Self Harm Result: Never   Recent Concern: Postpartum Depression - Medium Risk (2024)    [place]  Depression Scale     Last EPDS Total Score: 6     Last EPDS Self Harm Result: Never       The following portions of the patient's history were reviewed and updated as appropriate: allergies, current medications, past family history, past medical history, obstetric history, gynecologic history, past social history, past surgical history and problem list.    Objective:  /72 (BP Location: Left arm, Patient Position: Sitting, Cuff Size: Standard)   Ht 5' 5\" (1.651 m)   Wt 80.6 kg (177 lb 9.6 oz)   " Breastfeeding No   BMI 29.55 kg/m²   Pregravid Weight/BMI: No episode found (BMI Could not be calculated)  Current Weight: 80.6 kg (177 lb 9.6 oz)   Total Weight Gain: Not found.   Pre-Dave Vitals      Flowsheet Row Most Recent Value   Prenatal Assessment    Prenatal Vitals    Blood Pressure 126/72   Weight - Scale 80.6 kg (177 lb 9.6 oz)   Urine Albumin/Glucose    Dilation/Effacement/Station    Vaginal Drainage    Edema              Chaperone present? Yes: Ladan Jimenez MA.    General Appearance: alert and oriented, in no acute distress.   Abdomen: Soft, non-tender, non-distended, no masses, no rebound or guarding.  Pelvic:       External genitalia: Normal appearance, no abnormal pigmentation, no lesions or masses. Normal [place]'s and [place]'s. Obstetric laceration well-healed.       Urinary system: Urethral meatus normal, bladder non-tender.      Vaginal: normal mucosa without prolapse or lesions. Normal-appearing physiologic discharge.      Cervix: Normal-appearing, well-epithelialized, no gross lesions or masses. No cervical motion tenderness.      Adnexa: No adnexal masses or tenderness noted.      Uterus: Normal-sized, regular contour, midline, mobile, no uterine tenderness.   Extremities: Normal range of motion.   Skin: normal, no rash or abnormalities  Neurologic: alert, oriented x3  Psychiatric: Appropriate affect, mood stable, cooperative with exam.        Gera Perera MD  2024 8:39 AM

## 2024-06-25 NOTE — ASSESSMENT & PLAN NOTE
- BP normal on Procardia. Will stop medication and check BP daily at home for 1 week. Patient will send values to me via One[employer]way for review.

## 2024-07-03 RX ORDER — NIFEDIPINE 30 MG/1
30 TABLET, EXTENDED RELEASE ORAL DAILY
Qty: 60 TABLET | Refills: 0 | Status: SHIPPED | OUTPATIENT
Start: 2024-07-03

## 2025-03-19 ENCOUNTER — OFFICE VISIT (OUTPATIENT)
Dept: URGENT CARE | Facility: CLINIC | Age: 35
End: 2025-03-19
Payer: COMMERCIAL

## 2025-03-19 ENCOUNTER — APPOINTMENT (OUTPATIENT)
Dept: URGENT CARE | Facility: CLINIC | Age: 35
End: 2025-03-19
Payer: COMMERCIAL

## 2025-03-19 VITALS
RESPIRATION RATE: 16 BRPM | DIASTOLIC BLOOD PRESSURE: 86 MMHG | TEMPERATURE: 97.8 F | OXYGEN SATURATION: 99 % | HEART RATE: 106 BPM | SYSTOLIC BLOOD PRESSURE: 132 MMHG

## 2025-03-19 DIAGNOSIS — R30.0 DYSURIA: Primary | ICD-10-CM

## 2025-03-19 LAB
SL AMB  POCT GLUCOSE, UA: NORMAL
SL AMB LEUKOCYTE ESTERASE,UA: NORMAL
SL AMB POCT BILIRUBIN,UA: NORMAL
SL AMB POCT BLOOD,UA: NORMAL
SL AMB POCT CLARITY,UA: CLEAR
SL AMB POCT COLOR,UA: YELLOW
SL AMB POCT KETONES,UA: NORMAL
SL AMB POCT NITRITE,UA: NORMAL
SL AMB POCT PH,UA: 6.5
SL AMB POCT SPECIFIC GRAVITY,UA: 1.01
SL AMB POCT URINE PROTEIN: NORMAL
SL AMB POCT UROBILINOGEN: 0.2

## 2025-03-19 PROCEDURE — 99213 OFFICE O/P EST LOW 20 MIN: CPT

## 2025-03-19 PROCEDURE — 81002 URINALYSIS NONAUTO W/O SCOPE: CPT

## 2025-03-19 RX ORDER — SULFAMETHOXAZOLE AND TRIMETHOPRIM 800; 160 MG/1; MG/1
1 TABLET ORAL EVERY 12 HOURS SCHEDULED
Qty: 10 TABLET | Refills: 0 | Status: SHIPPED | OUTPATIENT
Start: 2025-03-19 | End: 2025-03-23 | Stop reason: ALTCHOICE

## 2025-03-19 RX ORDER — PHENAZOPYRIDINE HYDROCHLORIDE 100 MG/1
100 TABLET, FILM COATED ORAL 3 TIMES DAILY PRN
Qty: 6 TABLET | Refills: 0 | Status: SHIPPED | OUTPATIENT
Start: 2025-03-19 | End: 2025-03-21

## 2025-03-19 NOTE — PATIENT INSTRUCTIONS
Follow up with OBGYN  for recurrent UTIs  We sent a culture of your urine today , results take up to 3 days   Take ABX as prescribed   Increase water intake    Urinate within 5 minutes following intercourse

## 2025-03-19 NOTE — PROGRESS NOTES
[facility]        NAME: Michelle Marion is a 35 y.o. female  : 1990    MRN: [id number]  DATE: 2025  TIME: 9:55 AM    Assessment and Plan   Dysuria [R30.0]  1. Dysuria  POCT urine dip    Urine culture    Urine culture    sulfamethoxazole-trimethoprim (BACTRIM DS) 800-160 mg per tablet    phenazopyridine (PYRIDIUM) 100 mg tablet      Patient is still having UTI symptoms despite being on 2 different abx, although pt did not complete first course of abx. Urine culture sent. Will start patient on a 5 day course of bactrim and also prescribed pyridium for symptom relief. Encouraged patient to follow up with OBGYN since she has had recurrent UTI'S for the past 10 months. Educated patient on signs and symptoms of worsening infection such as nausea/vomiting/fever/flank pain  and to present to ER .        Patient Instructions     Follow up with OBGYN  for recurrent UTIs  We sent a culture of your urine today , results take up to 3 days   Take ABX as prescribed   Increase water intake    Urinate within 5 minutes following intercourse  Follow up with PCP in 3-5 days.  Proceed to  ER if symptoms worsen.    If tests have been performed at [facility], our office will contact you with results if changes need to be made to the care plan discussed with you at the visit.  [facility]    Chief Complaint     Chief Complaint   Patient presents with    Possible UTI     Patient took Macrobid before leaving for [place] for a UTI, but left for [place] and forgot it, so never finished those abx. Patient states she felt like her sx came back, so she went on a website, and was prescribed cephalexin. Patient states she finished that yesterday. Patient states she feels better, but still with mild burning. Patient states she took an at home urine test, which stated she was still having some time of infection there.          History of Present Illness       Patient  reports she was on Macrobid approx a week and a half ago for a UTI. She went on vacation to [place] and left the remainder of her prescription there so she did not take the full course of abx. When she returned home she still felt the symptoms of a UTI so she used an online website that provided home urine dipsticks. She reports she had leukocytes in her urine so they started her on Keflex. She reports she finished the ABX yesterday and is still having UTI symptoms of burning  and frequency. She reports she dipped her urine again last evening and it was positive foe leukocytes. She denies hematuria / fevers/nausea/vomiting /abdominal pain/acute back pain/chest pain and sob.        Review of Systems   Review of Systems   Constitutional:  Negative for activity change, appetite change, chills, diaphoresis, fatigue and fever.   HENT:  Negative for congestion, ear discharge, facial swelling, hearing loss, nosebleeds, postnasal drip, sinus pressure, sinus pain, sneezing and sore throat.    Respiratory:  Negative for cough, chest tightness, shortness of breath and wheezing.    Cardiovascular:  Negative for chest pain and leg swelling.   Gastrointestinal:  Negative for abdominal distention, abdominal pain, constipation, diarrhea, nausea and vomiting.   Genitourinary:  Positive for dysuria, frequency and urgency. Negative for decreased urine volume, difficulty urinating, flank pain, hematuria, menstrual problem and pelvic pain.   Musculoskeletal:  Negative for back pain, gait problem, joint swelling, myalgias, neck pain and neck stiffness.   Neurological:  Negative for dizziness, syncope, weakness, light-headedness, numbness and headaches.   Hematological:  Negative for adenopathy.   Psychiatric/Behavioral:  Negative for confusion.          Current Medications       Current Outpatient Medications:     phenazopyridine (PYRIDIUM) 100 mg tablet, Take 1 tablet (100 mg total) by mouth 3 (three) times a day as needed for bladder  spasms for up to 2 days, Disp: 6 tablet, Rfl: 0    sertraline (ZOLOFT) 100 mg tablet, Take 100 mg by mouth daily, Disp: , Rfl:     sulfamethoxazole-trimethoprim (BACTRIM DS) 800-160 mg per tablet, Take 1 tablet by mouth every 12 (twelve) hours for 5 days, Disp: 10 tablet, Rfl: 0    Ascorbic Acid (Vitamin C) 100 MG CHEW, Chew, Disp: , Rfl:     Ferrous Sulfate ER 50 MG TBCR, Take by mouth, Disp: , Rfl:     NIFEdipine (PROCARDIA XL) 30 mg 24 hr tablet, take 1 tablet by mouth once daily (Patient not taking: Reported on 3/19/2025), Disp: 60 tablet, Rfl: 0    Prenatal-FE Bis-FA-DHA w/o A (COMPLETE PRENATAL/DHA PO), Take by mouth Natiure's Bounty with DHA (43 mg) 8 mg EPA, Disp: , Rfl:     Current Allergies     Allergies as of 03/19/2025    (No Known Allergies)            The following portions of the patient's history were reviewed and updated as appropriate: allergies, current medications, past family history, past medical history, past social history, past surgical history and problem list.     Past Medical History:   Diagnosis Date    Anxiety     History of abnormal cervical Pap smear     +HPV    History of ovarian cyst     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 2010       Past Surgical History:   Procedure Laterality Date    TONSILECTOMY AND ADNOIDECTOMY      WISDOM TOOTH EXTRACTION         Family History   Problem Relation Age of Onset    Esophageal cancer Father          Medications have been verified.        Objective   /86   Pulse (!) 106   Temp 97.8 °F (36.6 °C)   Resp 16   LMP 02/26/2025 (Approximate)   SpO2 99%   Patient's last menstrual period was 02/26/2025 (approximate).       Physical Exam     Physical Exam  Constitutional:       General: She is not in acute distress.     Appearance: Normal appearance. She is normal weight. She is not ill-appearing, toxic-appearing or diaphoretic.   HENT:      Head: Normocephalic and atraumatic.      Right Ear: Tympanic membrane, ear  canal and external ear normal.      Left Ear: Tympanic membrane, ear canal and external ear normal.      Nose: Nose normal. No congestion or rhinorrhea.      Mouth/Throat:      Mouth: Mucous membranes are moist.      Pharynx: No oropharyngeal exudate or posterior oropharyngeal erythema.   Eyes:      Extraocular Movements: Extraocular movements intact.      Conjunctiva/sclera: Conjunctivae normal.      Pupils: Pupils are equal, round, and reactive to light.   Cardiovascular:      Rate and Rhythm: Normal rate and regular rhythm.   Pulmonary:      Effort: Pulmonary effort is normal. No respiratory distress.      Breath sounds: Normal breath sounds. No stridor. No wheezing or rhonchi.   Abdominal:      General: Bowel sounds are normal. There is no distension.      Palpations: Abdomen is soft. There is no mass.   Musculoskeletal:         General: No swelling or tenderness. Normal range of motion.      Cervical back: Normal range of motion and neck supple.   Skin:     General: Skin is warm and dry.      Capillary Refill: Capillary refill takes 2 to 3 seconds.      Coloration: Skin is not jaundiced or pale.   Neurological:      General: No focal deficit present.      Mental Status: She is alert and oriented to person, place, and time. Mental status is at baseline.   Psychiatric:         Mood and Affect: Mood normal.         Behavior: Behavior normal.         Thought Content: Thought content normal.         Judgment: Judgment normal.

## 2025-03-23 DIAGNOSIS — A49.02 MRSA INFECTION: ICD-10-CM

## 2025-03-23 DIAGNOSIS — R82.71 BACTERIURIA: Primary | ICD-10-CM

## 2025-03-23 RX ORDER — NITROFURANTOIN 25; 75 MG/1; MG/1
100 CAPSULE ORAL 2 TIMES DAILY
Qty: 28 CAPSULE | Refills: 0 | Status: SHIPPED | OUTPATIENT
Start: 2025-03-23 | End: 2025-04-06

## 2025-03-23 NOTE — PROGRESS NOTES
Patient called clinic to discuss results of urine culture. Patient states she did not start taking Bactrim which was prescribed to her at clinic visit on 3/19. Still symptomatic. Discussed management of MRSA in urine with coworker Georgia Cordero PA-C. Plan to start patient on 14-day course of suppressive Macrobid and provide STAT referral to urology for further management. Patient aware of same and is agreeable to plan of care.

## 2025-03-24 ENCOUNTER — TELEPHONE (OUTPATIENT)
Age: 35
End: 2025-03-24

## 2025-03-24 NOTE — TELEPHONE ENCOUNTER
New Patient      Insurance   Current Insurance?  [employer]           Insurance E-verified?   yes     History   Reason for appointment/active symptoms?  Bacteriuria, MRSA infection    Has the patient had any previous Urologist(s)?  no  Was the patient seen in the ED? no     Labs/Imaging(Including Out Of Network)? Labs 3/23/25     Records Requested?  no  Records Visible in EPIC?  Yes   Personal history of cancer?  No      Appointment   Office location preference:  [place]

## 2025-04-07 NOTE — PROGRESS NOTES
Name: Michelle Marion      : 1990      MRN: [id number]  Encounter Provider: MEÑO Urias  Encounter Date: 2025   Encounter department: [facility]OLOG[facility] [place]  :  Assessment & Plan  Acute cystitis without hematuria  Patient presented to the urgent care on 3/19/2025 with complaints of UTI.  She had a urine culture done that was positive for MRSA.  She was placed on a 2-week course of Macrobid.      Overall she has seen significant improvement in her symptoms.  Will plan to send urine studies today with a microscopic urinalysis and urine culture to ensure the bacteria has been eradicated.  I did discuss with her that if the bacteria is still present I would place her on additional 2-week course of Macrobid.  If she still has a MRSA UTI despite a 4-week course of Macrobid I would then recommend IV antibiotics with vancomycin.      She still having some mild pain and discomfort in her lower abdomen, I did recommend utilizing Motrin/Tylenol for the pain and discomfort.  We also discussed the importance of adequate water intake of at least 64 ounces daily. She should avoid bladder irritants.       If she does not have any recurrent UTIs we can plan to follow on an as-needed basis.  Orders:    Ambulatory Referral to Urology    Urinalysis with microscopic    Urine culture      History of Present Illness   Michelle Marion is a 35 y.o. female who presents today to the office as a new patient for further evaluation of a recent urinary tract infection that was positive for a low colony count of MRSA.  She was placed on a 14-day course of Macrobid by urgent care.    Today in the office she states that she is overall feeling well.  She states that after completing the course of Macrobid she feels much better.  She states that she still feels some very mild discomfort/twinging in her lower abdominal area.  She does utilize Motrin which typically helps with her symptoms.  She  "states that she also has been trying to stay well-hydrated with water throughout the day.  She denies any history of recurrent/frequent UTIs.  She denies any recent surgical intervention or medical device placement.  She denies any hematuria or dysuria.    She does not correlate the UTI with any sexual intercourse.  She denies any open wounds or sores into the pelvic/genital area.  She denies any recent use of antibiotics.    Review of Systems   Constitutional:  Negative for chills and fever.   Respiratory: Negative.  Negative for cough and shortness of breath.    Cardiovascular:  Negative for chest pain and leg swelling.   Gastrointestinal:  Positive for abdominal pain.   Genitourinary:  Negative for dyspareunia, dysuria, flank pain, frequency, hematuria, menstrual problem, pelvic pain, urgency, vaginal bleeding, vaginal discharge and vaginal pain.   Skin:  Negative for rash.   Neurological: Negative.    Hematological:  Negative for adenopathy. Does not bruise/bleed easily.          Objective   LMP 02/26/2025 (Approximate)     Physical Exam  Vitals reviewed.   Constitutional:       Appearance: Normal appearance.   HENT:      Head: Normocephalic and atraumatic.   Eyes:      Pupils: Pupils are equal, round, and reactive to light.   Cardiovascular:      Rate and Rhythm: Normal rate.   Pulmonary:      Effort: Pulmonary effort is normal.   Abdominal:      General: Abdomen is flat.      Palpations: Abdomen is soft.   Musculoskeletal:      Cervical back: Normal range of motion.   Skin:     General: Skin is warm and dry.   Neurological:      General: No focal deficit present.      Mental Status: She is alert and oriented to person, place, and time. Mental status is at baseline.   Psychiatric:         Mood and Affect: Mood normal.         Behavior: Behavior normal.         Thought Content: Thought content normal.         Judgment: Judgment normal.           Results   No results found for: \"PSA\"  Lab Results   Component " Value Date    CALCIUM 9.5 05/07/2024    K 3.8 05/07/2024    CO2 23 05/07/2024     05/07/2024    BUN 10 05/07/2024    CREATININE 0.48 (L) 05/07/2024     Lab Results   Component Value Date    WBC 9.83 05/07/2024    HGB 13.1 05/07/2024    HCT 41.1 05/07/2024    MCV 86 05/07/2024     05/07/2024       Office Urine Dip  No results found for this or any previous visit (from the past hour).

## 2025-04-08 ENCOUNTER — OFFICE VISIT (OUTPATIENT)
Dept: UROLOGY | Facility: AMBULATORY SURGERY CENTER | Age: 35
End: 2025-04-08
Payer: COMMERCIAL

## 2025-04-08 VITALS
SYSTOLIC BLOOD PRESSURE: 144 MMHG | HEIGHT: 65 IN | OXYGEN SATURATION: 98 % | BODY MASS INDEX: 31.99 KG/M2 | HEART RATE: 110 BPM | DIASTOLIC BLOOD PRESSURE: 92 MMHG | WEIGHT: 192 LBS

## 2025-04-08 DIAGNOSIS — N30.00 ACUTE CYSTITIS WITHOUT HEMATURIA: Primary | ICD-10-CM

## 2025-04-08 DIAGNOSIS — R82.71 BACTERIURIA: ICD-10-CM

## 2025-04-08 DIAGNOSIS — A49.02 MRSA INFECTION: ICD-10-CM

## 2025-04-08 PROCEDURE — 99204 OFFICE O/P NEW MOD 45 MIN: CPT

## 2025-04-08 RX ORDER — PHENAZOPYRIDINE HYDROCHLORIDE 200 MG/1
TABLET, FILM COATED ORAL
COMMUNITY
Start: 2025-03-10

## 2025-04-08 RX ORDER — ONDANSETRON 4 MG/1
4 TABLET, FILM COATED ORAL AS NEEDED
COMMUNITY
Start: 2025-03-10

## 2025-04-08 RX ORDER — CEPHALEXIN 500 MG/1
CAPSULE ORAL
COMMUNITY
Start: 2025-03-10

## 2025-04-08 NOTE — ASSESSMENT & PLAN NOTE
Patient presented to the urgent care on 3/19/2025 with complaints of UTI.  She had a urine culture done that was positive for MRSA.  She was placed on a 2-week course of Macrobid.      Overall she has seen significant improvement in her symptoms.  Will plan to send urine studies today with a microscopic urinalysis and urine culture to ensure the bacteria has been eradicated.  I did discuss with her that if the bacteria is still present I would place her on additional 2-week course of Macrobid.  If she still has a MRSA UTI despite a 4-week course of Macrobid I would then recommend IV antibiotics with vancomycin.      She still having some mild pain and discomfort in her lower abdomen, I did recommend utilizing Motrin/Tylenol for the pain and discomfort.  We also discussed the importance of adequate water intake of at least 64 ounces daily. She should avoid bladder irritants.       If she does not have any recurrent UTIs we can plan to follow on an as-needed basis.  Orders:    Ambulatory Referral to Urology    Urinalysis with microscopic    Urine culture

## 2025-04-09 LAB
APPEARANCE UR: CLEAR
BACTERIA UR QL AUTO: ABNORMAL /HPF
BILIRUB UR QL STRIP: NEGATIVE
COLOR UR: YELLOW
GLUCOSE UR QL STRIP: NEGATIVE
HGB UR QL STRIP: NEGATIVE
HYALINE CASTS #/AREA URNS LPF: ABNORMAL /LPF
KETONES UR QL STRIP: NEGATIVE
LEUKOCYTE ESTERASE UR QL STRIP: ABNORMAL
NITRITE UR QL STRIP: NEGATIVE
PH UR STRIP: 7 [PH] (ref 5–8)
PROT UR QL STRIP: NEGATIVE
RBC #/AREA URNS HPF: ABNORMAL /HPF
SP GR UR STRIP: 1 (ref 1–1.03)
SQUAMOUS #/AREA URNS HPF: ABNORMAL /HPF
WBC #/AREA URNS HPF: ABNORMAL /HPF

## 2025-04-10 ENCOUNTER — RESULTS FOLLOW-UP (OUTPATIENT)
Dept: UROLOGY | Facility: AMBULATORY SURGERY CENTER | Age: 35
End: 2025-04-10

## 2025-05-08 PROBLEM — N30.00 ACUTE CYSTITIS WITHOUT HEMATURIA: Status: RESOLVED | Noted: 2025-04-08 | Resolved: 2025-05-08
